# Patient Record
Sex: MALE | Race: WHITE | NOT HISPANIC OR LATINO | Employment: OTHER | URBAN - METROPOLITAN AREA
[De-identification: names, ages, dates, MRNs, and addresses within clinical notes are randomized per-mention and may not be internally consistent; named-entity substitution may affect disease eponyms.]

---

## 2018-01-24 ENCOUNTER — OFFICE VISIT (OUTPATIENT)
Dept: ORTHOPEDIC SURGERY | Facility: CLINIC | Age: 78
End: 2018-01-24

## 2018-01-24 VITALS — BODY MASS INDEX: 22.79 KG/M2 | WEIGHT: 159.2 LBS | TEMPERATURE: 98.5 F | HEIGHT: 70 IN

## 2018-01-24 DIAGNOSIS — M25.561 PAIN IN BOTH KNEES, UNSPECIFIED CHRONICITY: ICD-10-CM

## 2018-01-24 DIAGNOSIS — M25.562 PAIN IN BOTH KNEES, UNSPECIFIED CHRONICITY: ICD-10-CM

## 2018-01-24 DIAGNOSIS — G89.29 CHRONIC PAIN OF BOTH KNEES: ICD-10-CM

## 2018-01-24 DIAGNOSIS — M87.052 AVASCULAR NECROSIS OF BONES OF BOTH HIPS (HCC): ICD-10-CM

## 2018-01-24 DIAGNOSIS — M54.50 LOW BACK PAIN RADIATING TO BOTH LEGS: Primary | ICD-10-CM

## 2018-01-24 DIAGNOSIS — M25.561 CHRONIC PAIN OF BOTH KNEES: ICD-10-CM

## 2018-01-24 DIAGNOSIS — M79.604 LOW BACK PAIN RADIATING TO BOTH LEGS: Primary | ICD-10-CM

## 2018-01-24 DIAGNOSIS — M16.12 ARTHRITIS OF LEFT HIP: ICD-10-CM

## 2018-01-24 DIAGNOSIS — M51.36 DEGENERATIVE DISC DISEASE, LUMBAR: ICD-10-CM

## 2018-01-24 DIAGNOSIS — M16.11 ARTHRITIS OF RIGHT HIP: ICD-10-CM

## 2018-01-24 DIAGNOSIS — M25.562 CHRONIC PAIN OF BOTH KNEES: ICD-10-CM

## 2018-01-24 DIAGNOSIS — M87.051 AVASCULAR NECROSIS OF BONES OF BOTH HIPS (HCC): ICD-10-CM

## 2018-01-24 DIAGNOSIS — M79.605 LOW BACK PAIN RADIATING TO BOTH LEGS: Primary | ICD-10-CM

## 2018-01-24 PROCEDURE — 73521 X-RAY EXAM HIPS BI 2 VIEWS: CPT | Performed by: ORTHOPAEDIC SURGERY

## 2018-01-24 PROCEDURE — 73562 X-RAY EXAM OF KNEE 3: CPT | Performed by: ORTHOPAEDIC SURGERY

## 2018-01-24 PROCEDURE — 72100 X-RAY EXAM L-S SPINE 2/3 VWS: CPT | Performed by: ORTHOPAEDIC SURGERY

## 2018-01-24 PROCEDURE — 99204 OFFICE O/P NEW MOD 45 MIN: CPT | Performed by: ORTHOPAEDIC SURGERY

## 2018-01-24 NOTE — PROGRESS NOTES
Patient Name: Dong Cevallos   YOB: 1940  Referring Primary Care Physician: Hunter Law MD      Chief Complaint:    Chief Complaint   Patient presents with   • Right Hip - Establish Care, Pain   • Left Hip - Establish Care, Pain    hip and back pain that radiate down the front of the legs and knee pain    Subjective:    HPI:   Dong Cevallos is a pleasant 77 y.o. year old who presents today for evaluation of   Chief Complaint   Patient presents with   • Right Hip - Establish Care, Pain   • Left Hip - Establish Care, Pain   chronic pain that radiates as above.  No trauma.  Gradually harder to transfer, ambulate and dress over the last 4.5 yrs.  Has used a rollator but the wheel broke yesterday.  No traum, steroids or significant alcohol.  Doesn't hurt at rest.  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  seeen with his son. The right hurts more than the left and radiates to the knees.    This problem is new to this examiner.     Medications:   Home Medications:  Current Outpatient Prescriptions on File Prior to Visit   Medication Sig   • amLODIPine-benazepril (LOTREL 2.5-10) 2.5-10 MG per capsule Take 1 capsule by mouth daily.   • levETIRAcetam (KEPPRA) 500 MG tablet Take 1 tablet by mouth 2 (two) times a day.   • LORazepam (ATIVAN) 0.5 MG tablet Take 1 tablet by mouth every 8 (eight) hours as needed for anxiety.     No current facility-administered medications on file prior to visit.      Current Medications:  Scheduled Meds:  Continuous Infusions:  No current facility-administered medications for this visit.   PRN Meds:.    I have reviewed the patient's medical history in detail and updated the computerized patient record.  Review and summarization of old records includes:    Past Medical History:   Diagnosis Date   • Anxiety    • Hypertension    • Osteoarthritis         Past Surgical History:   Procedure Laterality Date   • APPENDECTOMY     • ENDOSCOPY          Social History      Occupational History   • Not on file.     Social History Main Topics   • Smoking status: Never Smoker   • Smokeless tobacco: Not on file   • Alcohol use No   • Drug use: Not on file   • Sexual activity: Not on file      Social History     Social History Narrative      No family history on file.    ROS: 14 point review of systems was performed and all other systems were reviewed and are negative except for documented findings in HPI and today's encounter.     Allergies:   Allergies   Allergen Reactions   • Asa [Aspirin]      Constitutional:  Denies fever, shaking or chills   Eyes:  Denies change in visual acuity   HENT:  Denies nasal congestion or sore throat   Respiratory:  Denies cough or shortness of breath   Cardiovascular:  Denies chest pain or severe LE edema   GI:  Denies abdominal pain, nausea, vomiting, bloody stools or diarrhea   Musculoskeletal:  Numbness, tingling, pain, or loss of motor function only as noted above in history of present illness.  : Denies painful urination or hematuria  Integument:  Denies rash, lesion or ulceration   Neurologic:  Denies headache or focal weakness  Endocrine:  Denies lymphadenopathy  Psych:  Denies confusion or change in mental status   Hem:  Denies active bleeding    Objective:    Physical Exam: 77 y.o. male  There is no height or weight on file to calculate BMI., @WT@, @HT@  There were no vitals filed for this visit.  Vital signs reviewed.   General Appearance:    Alert, cooperative, in no acute distress                  Eyes: conjunctiva clear  ENT: external ears and nose atraumatic  CV: no peripheral edema  Resp: normal respiratory effort  Skin: no rashes or wounds; normal turgor  Psych: mood and affect appropriate  Lymph: no nodes appreciated  Neuro: gross sensation intact  Vascular:  Palpable peripheral pulse in noted extremity  Musculoskeletal Extremities: the patient is in our wheelchair.  his knees have mild swelling but no sig joint line tenderness.  The  hips are tender and have crepiatation with any motion.  diffuse lower lumbar pain.  also with hx of large hernia on the right per the patient.     Radiology:   Imaging done today and discussed at length with the patient:    Indication: pain related symptoms,  Views: 2V AP&LAT, 3V AP, LAT & 40 degree PA bilateral knee(s), bilateral hip(s) and lumbar spine   Findings: severe end-stage arthritis (bone on bone, subchondral sclerosis/cysts, osteophytes), with avn of the hips and lumbar ddd with scoliosis, moderate arthrtis of the knees, calcification near pelvis- ? hernia  Comparison views: not available      Assessment:     ICD-10-CM ICD-9-CM   1. Low back pain radiating to both legs M54.5 724.2   2. Avascular necrosis of bones of both hips M87.051 733.42    M87.052    3. Arthritis of right hip M16.11 716.95   4. Arthritis of left hip M16.12 716.95   5. Degenerative disc disease, lumbar M51.36 722.52   6. Chronic pain of both knees M25.561 719.46    M25.562 338.29    G89.29    7. Pain in both knees, unspecified chronicity M25.561 719.46    M25.562         Procedures       Plan: Biomechanics of pertinent body area discussed.  Risks, benefits, alternatives, comparisons, and complications of accepted medicines, injections, recommendations, surgical procedures, and therapies explained and education provided in laymen's terms. The patient was given the opportunity to ask questions and they were answerved to their satisfaction.   Natural history and expected course of this patient's diagnosis discussed along with evaluation of therapies. Questions answered.  SAMIA: discussed hip replacement extensively and spent 45 minutes explaining xrays, diagnoses etc.  he and his son cynthia discuss and get back with me.if they wish to proceed. Continuation of conservative management vs. SAMIA discussed.  I reviewed anatomy of a total hip arthroplasty in laymen's terms, as well as typical postoperative recovery and possibly 6-12 months for  maximal recovery, and possible need for rehabilitation stay after hospitalization. We also discussed risks, benefits, alternatives, and limitations of procedure with risks including but not limited to neurovascular damage, bleeding, infection, malalignment, chronic pian, failure of implants, periprosthetic fracture, osteolysis, loosening of implants, loss of motion, weakness, stiffness, instability, DVT, pulmonary embolus, death, stroke, complex regional pain syndrome, myocardial infarction, and need for additional procedures. Concept of substitution vs. replacement discussed.  No guarantees were given regarding results of surgery.  Patient verbalized understanding, and was given the opportunity to ask and have all questions answered today.       1/24/2018

## 2018-01-25 ENCOUNTER — PREP FOR SURGERY (OUTPATIENT)
Dept: OTHER | Facility: HOSPITAL | Age: 78
End: 2018-01-25

## 2018-01-25 ENCOUNTER — TELEPHONE (OUTPATIENT)
Dept: ORTHOPEDIC SURGERY | Facility: CLINIC | Age: 78
End: 2018-01-25

## 2018-01-25 DIAGNOSIS — M16.11 ARTHRITIS OF RIGHT HIP: Primary | ICD-10-CM

## 2018-01-25 RX ORDER — CEFAZOLIN SODIUM 2 G/100ML
2 INJECTION, SOLUTION INTRAVENOUS ONCE
Status: CANCELLED | OUTPATIENT
Start: 2018-04-09 | End: 2018-04-09

## 2018-02-27 ENCOUNTER — APPOINTMENT (OUTPATIENT)
Dept: PREADMISSION TESTING | Facility: HOSPITAL | Age: 78
End: 2018-02-27

## 2018-02-27 VITALS
BODY MASS INDEX: 23.51 KG/M2 | WEIGHT: 164.19 LBS | HEART RATE: 86 BPM | SYSTOLIC BLOOD PRESSURE: 169 MMHG | OXYGEN SATURATION: 99 % | RESPIRATION RATE: 20 BRPM | TEMPERATURE: 97.5 F | HEIGHT: 70 IN | DIASTOLIC BLOOD PRESSURE: 93 MMHG

## 2018-02-27 DIAGNOSIS — M16.11 ARTHRITIS OF RIGHT HIP: ICD-10-CM

## 2018-02-27 LAB
AMORPH URATE CRY URNS QL MICRO: ABNORMAL /HPF
ANION GAP SERPL CALCULATED.3IONS-SCNC: 11.1 MMOL/L
BACTERIA UR QL AUTO: ABNORMAL /HPF
BILIRUB UR QL STRIP: NEGATIVE
BUN BLD-MCNC: 19 MG/DL (ref 8–23)
BUN/CREAT SERPL: 20.9 (ref 7–25)
CALCIUM SPEC-SCNC: 9.4 MG/DL (ref 8.6–10.5)
CHLORIDE SERPL-SCNC: 102 MMOL/L (ref 98–107)
CLARITY UR: ABNORMAL
CO2 SERPL-SCNC: 29.9 MMOL/L (ref 22–29)
COLOR UR: YELLOW
CREAT BLD-MCNC: 0.91 MG/DL (ref 0.76–1.27)
DEPRECATED RDW RBC AUTO: 54.8 FL (ref 37–54)
ERYTHROCYTE [DISTWIDTH] IN BLOOD BY AUTOMATED COUNT: 15.3 % (ref 11.5–14.5)
GFR SERPL CREATININE-BSD FRML MDRD: 81 ML/MIN/1.73
GLUCOSE BLD-MCNC: 116 MG/DL (ref 65–99)
GLUCOSE UR STRIP-MCNC: NEGATIVE MG/DL
HCT VFR BLD AUTO: 45.9 % (ref 40.4–52.2)
HGB BLD-MCNC: 14.8 G/DL (ref 13.7–17.6)
HGB UR QL STRIP.AUTO: ABNORMAL
HYALINE CASTS UR QL AUTO: ABNORMAL /LPF
KETONES UR QL STRIP: NEGATIVE
LEUKOCYTE ESTERASE UR QL STRIP.AUTO: NEGATIVE
MCH RBC QN AUTO: 31.3 PG (ref 27–32.7)
MCHC RBC AUTO-ENTMCNC: 32.2 G/DL (ref 32.6–36.4)
MCV RBC AUTO: 97 FL (ref 79.8–96.2)
NITRITE UR QL STRIP: NEGATIVE
PH UR STRIP.AUTO: 7 [PH] (ref 5–8)
PLATELET # BLD AUTO: 236 10*3/MM3 (ref 140–500)
PMV BLD AUTO: 9.7 FL (ref 6–12)
POTASSIUM BLD-SCNC: 3.7 MMOL/L (ref 3.5–5.2)
PROT UR QL STRIP: ABNORMAL
RBC # BLD AUTO: 4.73 10*6/MM3 (ref 4.6–6)
RBC # UR: ABNORMAL /HPF
REF LAB TEST METHOD: ABNORMAL
SODIUM BLD-SCNC: 143 MMOL/L (ref 136–145)
SP GR UR STRIP: 1.01 (ref 1–1.03)
SQUAMOUS #/AREA URNS HPF: ABNORMAL /HPF
UROBILINOGEN UR QL STRIP: ABNORMAL
WBC NRBC COR # BLD: 6.85 10*3/MM3 (ref 4.5–10.7)
WBC UR QL AUTO: ABNORMAL /HPF

## 2018-02-27 PROCEDURE — 85027 COMPLETE CBC AUTOMATED: CPT | Performed by: ORTHOPAEDIC SURGERY

## 2018-02-27 PROCEDURE — 80048 BASIC METABOLIC PNL TOTAL CA: CPT | Performed by: ORTHOPAEDIC SURGERY

## 2018-02-27 PROCEDURE — 93010 ELECTROCARDIOGRAM REPORT: CPT | Performed by: INTERNAL MEDICINE

## 2018-02-27 PROCEDURE — 81001 URINALYSIS AUTO W/SCOPE: CPT | Performed by: ORTHOPAEDIC SURGERY

## 2018-02-27 PROCEDURE — 36415 COLL VENOUS BLD VENIPUNCTURE: CPT

## 2018-02-27 PROCEDURE — 93005 ELECTROCARDIOGRAM TRACING: CPT

## 2018-02-27 RX ORDER — CHLORHEXIDINE GLUCONATE 500 MG/1
CLOTH TOPICAL
COMMUNITY
End: 2018-04-10 | Stop reason: HOSPADM

## 2018-02-27 ASSESSMENT — HOOS JR
HOOS JR SCORE: 16
HOOS JR SCORE: 39.902

## 2018-03-06 ENCOUNTER — OFFICE VISIT (OUTPATIENT)
Dept: ORTHOPEDIC SURGERY | Facility: CLINIC | Age: 78
End: 2018-03-06

## 2018-03-06 ENCOUNTER — OFFICE VISIT (OUTPATIENT)
Dept: SURGERY | Facility: CLINIC | Age: 78
End: 2018-03-06

## 2018-03-06 VITALS
BODY MASS INDEX: 23.48 KG/M2 | WEIGHT: 164 LBS | HEART RATE: 78 BPM | HEIGHT: 70 IN | DIASTOLIC BLOOD PRESSURE: 90 MMHG | OXYGEN SATURATION: 98 % | SYSTOLIC BLOOD PRESSURE: 155 MMHG

## 2018-03-06 VITALS — TEMPERATURE: 98 F | WEIGHT: 164 LBS | BODY MASS INDEX: 23.48 KG/M2 | HEIGHT: 70 IN

## 2018-03-06 DIAGNOSIS — K40.90 RIGHT INGUINAL HERNIA: Primary | ICD-10-CM

## 2018-03-06 DIAGNOSIS — M16.11 ARTHRITIS OF RIGHT HIP: Primary | ICD-10-CM

## 2018-03-06 PROCEDURE — 99214 OFFICE O/P EST MOD 30 MIN: CPT | Performed by: NURSE PRACTITIONER

## 2018-03-06 PROCEDURE — 99204 OFFICE O/P NEW MOD 45 MIN: CPT | Performed by: SURGERY

## 2018-03-06 RX ORDER — HYDROCORTISONE ACETATE 0.5 %
CREAM (GRAM) TOPICAL
Status: ON HOLD | COMMUNITY
End: 2018-03-09

## 2018-03-06 RX ORDER — B-COMPLEX WITH VITAMIN C
50 TABLET ORAL 4 TIMES DAILY
COMMUNITY
End: 2018-04-04

## 2018-03-06 NOTE — PROGRESS NOTES
Subjective   Dong Cevallos is a 77 y.o. male who presents to the office in surgical consultation from Hunter Law MD for a right inguinal hernia.    History of Present Illness     The patient has had a long-standing right inguinal hernia for several years.  It has slowly gotten larger and increasingly symptomatic.  He does have symptoms of pain as well as irregularity in his bowel function.  He also has some bladder dysfunction with frequent episodes of nocturia.  He has noticed enlarging disfiguration of his scrotum.    Review of Systems   Constitutional: Negative for activity change, appetite change, fatigue and fever.   HENT: Negative for trouble swallowing and voice change.    Respiratory: Negative for chest tightness and shortness of breath.    Cardiovascular: Negative for chest pain and palpitations.   Gastrointestinal: Positive for abdominal pain and constipation. Negative for blood in stool, diarrhea, nausea and vomiting.   Endocrine: Negative for cold intolerance and heat intolerance.   Genitourinary: Negative for dysuria and flank pain.   Neurological: Negative for dizziness and light-headedness.   Hematological: Negative for adenopathy. Does not bruise/bleed easily.   Psychiatric/Behavioral: Negative for agitation and confusion.     Past Medical History:   Diagnosis Date   • Bilateral hip pain    • History of anxiety    • History of prostate cancer    • History of seizures     NONE SINCE 9/2013   • Hypertension    • Inguinal hernia    • Osteoarthritis    • PONV (postoperative nausea and vomiting)    • Stroke 2008   • Unsteady gait      Past Surgical History:   Procedure Laterality Date   • APPENDECTOMY     • ENDOSCOPY     • PROSTATECTOMY       Family History   Problem Relation Age of Onset   • Malig Hyperthermia Neg Hx      Social History     Social History   • Marital status:      Spouse name: N/A   • Number of children: N/A   • Years of education: N/A     Occupational History   • Not on  file.     Social History Main Topics   • Smoking status: Never Smoker   • Smokeless tobacco: Never Used   • Alcohol use No   • Drug use: No   • Sexual activity: Defer     Other Topics Concern   • Not on file     Social History Narrative       Objective   Physical Exam   Constitutional: He is oriented to person, place, and time. He appears well-developed and well-nourished.  Non-toxic appearance.   Eyes: EOM are normal. No scleral icterus.   Pulmonary/Chest: Effort normal. No respiratory distress.   Abdominal: Soft. Normal appearance. There is no tenderness. A hernia is present. Hernia confirmed positive in the right inguinal area (There is a large right inguinal hernia that extends into the scrotum with significant GI contents in the scrotum). Hernia confirmed negative in the left inguinal area.   Neurological: He is alert and oriented to person, place, and time.   Skin: Skin is warm and dry.   Psychiatric: He has a normal mood and affect. His behavior is normal. Judgment and thought content normal.       Assessment/Plan       The encounter diagnosis was Right inguinal hernia.    The patient has a very large right inguinal hernia that extends into the scrotum and contains GI contents.  It may also contained a portion of the bladder.  He is at high risk of complications related to this large right inguinal hernia.  He will need a right inguinal hernia repair that may require a laparotomy for complete management.  The patient understands the indications, alternatives, risks, and benefits of the procedure and wishes to proceed.

## 2018-03-06 NOTE — PROGRESS NOTES
History & Physical       Patient: Dong Cevallos  YOB: 1940  Medical Record Number: [id number]  Body mass index is 23.53 kg/(m^2).    Surgeon:  Dr. George Gonzalez    Chief Complaints:   Chief Complaint   Patient presents with   • Right Hip - Follow-up, Pain       Subjective:    History of Present Illness: 77 y.o. male presents with   Chief Complaint   Patient presents with   • Right Hip - Follow-up, Pain   .Is here today for H&P visit but just found out from Dr. Daryn Zamora that he needs to have hernia repair prior to hip replacement.  We will plan on rescheduling for 8 weeks for R SAMIA..  Onset of symptoms was years ago and has been progressively worsening despite more conservative treatment measures.  Symptoms are associated with ability to move, exercise, and perform activities of daily living.  Symptoms are aggravated by weight bearing and ROM necessary for activities of daily living.   Symptoms improve with rest, ice and elevation only minimally.      Allergies:   Allergies   Allergen Reactions   • Asa [Aspirin] GI Bleeding       Medications:   Home Medications:  Current Outpatient Prescriptions on File Prior to Visit   Medication Sig   • Chlorhexidine Gluconate Cloth 2 % pads Apply  topically. AS DIRECTED PREOP   • mupirocin (BACTROBAN NASAL) 2 % nasal ointment into each nostril. AS DIRECTED PREOP   • [DISCONTINUED] amLODIPine-benazepril (LOTREL 2.5-10) 2.5-10 MG per capsule Take 1 capsule by mouth daily.   • [DISCONTINUED] levETIRAcetam (KEPPRA) 500 MG tablet Take 1 tablet by mouth 2 (two) times a day.   • [DISCONTINUED] LORazepam (ATIVAN) 0.5 MG tablet Take 1 tablet by mouth every 8 (eight) hours as needed for anxiety.     No current facility-administered medications on file prior to visit.      Current Medications:  Scheduled Meds:  Continuous Infusions:  No current facility-administered medications for this visit.   PRN Meds:.    I have reviewed the patient's medical history in detail  and updated the computerized patient record.  Review and summarization of old records include:    Past Medical History:   Diagnosis Date   • Bilateral hip pain    • History of anxiety    • History of prostate cancer    • History of seizures     NONE SINCE 9/2013   • Hypertension    • Inguinal hernia    • Osteoarthritis    • PONV (postoperative nausea and vomiting)    • Stroke 2008   • Unsteady gait         Past Surgical History:   Procedure Laterality Date   • APPENDECTOMY     • ENDOSCOPY     • PROSTATECTOMY          Social History     Occupational History   • Not on file.     Social History Main Topics   • Smoking status: Never Smoker   • Smokeless tobacco: Never Used   • Alcohol use No   • Drug use: No   • Sexual activity: Defer    Social History     Social History Narrative        Family History   Problem Relation Age of Onset   • Malig Hyperthermia Neg Hx        ROS: 14 point review of systems was performed and was negative except for documented findings in HPI and today's encounter.     Allergies:   Allergies   Allergen Reactions   • Asa [Aspirin] GI Bleeding     Constitutional:  Denies fever, shaking or chills   Eyes:  Denies change in visual acuity   HENT:  Denies nasal congestion or sore throat   Respiratory:  Denies cough or shortness of breath   Cardiovascular:  Denies chest pain or severe LE edema   GI:  Denies abdominal pain, nausea, vomiting, bloody stools or diarrhea   Musculoskeletal:  Denies numbness tingling or loss of motor function except as outlined above in history of present illness.  : Denies painful urination or did have Hematuria with UA for preop labs and discussed will need to have this rechecked by PCP when recovered from hernia repair.   Integument:  Denies rash, lesion or ulceration   Neurologic:  Denies headache or focal weakness  Endocrine:  Denies lymphadenopathy  Psych:  Denies confusion or change in mental status   Hem:  Denies active bleeding    Physical Exam: 77 y.o. male  Body  mass index is 23.53 kg/(m^2)., @HT@, @WT@  Vitals:    03/06/18 1316   Temp: 98 °F (36.7 °C)     Vital signs reviewed.   General Appearance:    Alert, cooperative, in no acute distress                  Eyes: conjunctiva clear  ENT: external ears and nose atraumatic  CV: no peripheral edema  Resp: normal respiratory effort  Skin: no rashes or wounds; normal turgor  Psych: mood and affect appropriate  Lymph: no nodes appreciated  Neuro: gross sensation intact  Vascular:  Palpable peripheral pulse in noted extremity  Musculoskeletal Extremities: HIP Exam: antalgic and stiff-legged gait with assistive device right hip, Stinchfield postitive, pain with internal rotation, stiffness and MEERA test positive 2+ pedal pulses and brisk capillary refill Pedal edema none          Diagnostic Tests:  Appointment on 02/27/2018   Component Date Value Ref Range Status   • Glucose 02/27/2018 116* 65 - 99 mg/dL Final   • BUN 02/27/2018 19  8 - 23 mg/dL Final   • Creatinine 02/27/2018 0.91  0.76 - 1.27 mg/dL Final   • Sodium 02/27/2018 143  136 - 145 mmol/L Final   • Potassium 02/27/2018 3.7  3.5 - 5.2 mmol/L Final   • Chloride 02/27/2018 102  98 - 107 mmol/L Final   • CO2 02/27/2018 29.9* 22.0 - 29.0 mmol/L Final   • Calcium 02/27/2018 9.4  8.6 - 10.5 mg/dL Final   • eGFR Non  Amer 02/27/2018 81  >60 mL/min/1.73 Final   • BUN/Creatinine Ratio 02/27/2018 20.9  7.0 - 25.0 Final   • Anion Gap 02/27/2018 11.1  mmol/L Final   • WBC 02/27/2018 6.85  4.50 - 10.70 10*3/mm3 Final   • RBC 02/27/2018 4.73  4.60 - 6.00 10*6/mm3 Final   • Hemoglobin 02/27/2018 14.8  13.7 - 17.6 g/dL Final   • Hematocrit 02/27/2018 45.9  40.4 - 52.2 % Final   • MCV 02/27/2018 97.0* 79.8 - 96.2 fL Final   • MCH 02/27/2018 31.3  27.0 - 32.7 pg Final   • MCHC 02/27/2018 32.2* 32.6 - 36.4 g/dL Final   • RDW 02/27/2018 15.3* 11.5 - 14.5 % Final   • RDW-SD 02/27/2018 54.8* 37.0 - 54.0 fl Final   • MPV 02/27/2018 9.7  6.0 - 12.0 fL Final   • Platelets 02/27/2018 236   140 - 500 10*3/mm3 Final   • Color, UA 02/27/2018 Yellow  Yellow, Straw Final   • Appearance, UA 02/27/2018 Cloudy* Clear Final   • pH, UA 02/27/2018 7.0  5.0 - 8.0 Final   • Specific Gravity, UA 02/27/2018 1.015  1.005 - 1.030 Final   • Glucose, UA 02/27/2018 Negative  Negative Final   • Ketones, UA 02/27/2018 Negative  Negative Final   • Bilirubin, UA 02/27/2018 Negative  Negative Final   • Blood, UA 02/27/2018 Moderate (2+)* Negative Final   • Protein, UA 02/27/2018 Trace* Negative Final   • Leuk Esterase, UA 02/27/2018 Negative  Negative Final   • Nitrite, UA 02/27/2018 Negative  Negative Final   • Urobilinogen, UA 02/27/2018 0.2 E.U./dL  0.2 - 1.0 E.U./dL Final   • RBC, UA 02/27/2018 21-30* None Seen, 0-2 /HPF Final   • WBC, UA 02/27/2018 0-2  None Seen, 0-2 /HPF Final   • Bacteria, UA 02/27/2018 1+* None Seen /HPF Final   • Squamous Epithelial Cells, UA 02/27/2018 0-2  None Seen, 0-2 /HPF Final   • Hyaline Casts, UA 02/27/2018 None Seen  None Seen /LPF Final   • Amorphous Crystals, UA 02/27/2018 Large/3+  None Seen /HPF Final   • Methodology 02/27/2018 Automated Microscopy   Final     No results found.    Imaging was done previously in the office, images were personally viewed and discussed at length with the patient:    Indication: pain related symptoms,  Views: 2V AP&LAT right hip(s)   Findings: severe end-stage arthritis (bone on bone, subchondral sclerosis/cysts, osteophytes)  Comparison views: viewed last xray done in the office.     Assessment:  Patient Active Problem List   Diagnosis   • Hypertension   • Anxiety   • Osteoarthritis   • Seizure disorder   • Low back pain radiating to both legs   • Avascular necrosis of bones of both hips   • Arthritis of right hip   • Arthritis of left hip   • Degenerative disc disease, lumbar   • Chronic pain of both knees   • Pain in both knees       Plan:  Dr. George oGnzalez reviewed anatomy of a total joint arthroplasty in laymen's terms, as well as typical  postoperative recovery and possibly 6-12 months for maximal recovery, and possible need for rehabilitation stay after hospitalization. We also discussed risks, benefits, alternatives, and limitations of procedure with risks including but not limited to neurovascular damage, bleeding, infection, malalignment, chronic pian, failure of implants, osteolysis, loosening of implants, loss of motion, weakness, stiffness, instability, DVT, pulmonary embolus, death, stroke, complex regional pain syndrome, myocardial infarction, and need for additional procedures. Concept of substitution vs. replacement discussed.  No guarantees were given regarding results of surgery.      Dong Cevallos was given the opportunity to ask and have all questions answered today.  The patient voiced understanding of the risks, benefits, and alternative forms of treatment that were discussed and the patient consents to proceed with surgery.     Patient's blood clot history is negative.  Planned DVT prophylaxis for surgery:  Xarelto    Hematuria with UA for preop labs and discussed will need to have this rechecked by PCP when recovered from hernia repair. We will plan on rescheduling for 8 weeks for R SAMIA.   Dr. Daryn Zamora that he needs to have hernia repair prior to hip replacement. I concur with this from ortho standpoint.     Discharge Plan: POD 2-3 to home and home health lives in [facility] but has emergency cord in bathroom for [facility]    Patient was seen by MIRELLA Em in the office today.    Date: 3/6/2018  MIRELLA White    Discussed this plan with Dr. Daryn Zamora and he thinks it is possible that the blood in the urine could be from incarceration of the bladder and will further investigate post operatively and will have his office call to schedule the hernia repair.     35 min spent with the patient with 30 spent in discussion face to face.

## 2018-03-09 ENCOUNTER — HOSPITAL ENCOUNTER (OUTPATIENT)
Facility: HOSPITAL | Age: 78
Discharge: HOME | End: 2018-03-10
Attending: SURGERY | Admitting: SURGERY

## 2018-03-09 ENCOUNTER — ANESTHESIA EVENT (OUTPATIENT)
Dept: PERIOP | Facility: HOSPITAL | Age: 78
End: 2018-03-09

## 2018-03-09 ENCOUNTER — ANESTHESIA (OUTPATIENT)
Dept: PERIOP | Facility: HOSPITAL | Age: 78
End: 2018-03-09

## 2018-03-09 DIAGNOSIS — K40.90 RIGHT INGUINAL HERNIA: ICD-10-CM

## 2018-03-09 PROCEDURE — 25010000002 PROPOFOL 10 MG/ML EMULSION: Performed by: ANESTHESIOLOGY

## 2018-03-09 PROCEDURE — 25010000002 NEOSTIGMINE PER 0.5 MG: Performed by: ANESTHESIOLOGY

## 2018-03-09 PROCEDURE — 63710000001 OXYCODONE-ACETAMINOPHEN 5-325 MG TABLET: Performed by: SURGERY

## 2018-03-09 PROCEDURE — 49505 PRP I/HERN INIT REDUC >5 YR: CPT | Performed by: SURGERY

## 2018-03-09 PROCEDURE — 25010000002 FENTANYL CITRATE (PF) 100 MCG/2ML SOLUTION: Performed by: ANESTHESIOLOGY

## 2018-03-09 PROCEDURE — 25010000002 MIDAZOLAM PER 1 MG: Performed by: ANESTHESIOLOGY

## 2018-03-09 PROCEDURE — 25010000002 CEFOXITIN PER 1 G: Performed by: SURGERY

## 2018-03-09 PROCEDURE — C1781 MESH (IMPLANTABLE): HCPCS | Performed by: SURGERY

## 2018-03-09 PROCEDURE — 25010000002 ONDANSETRON PER 1 MG: Performed by: ANESTHESIOLOGY

## 2018-03-09 PROCEDURE — 25010000002 SUCCINYLCHOLINE PER 20 MG: Performed by: ANESTHESIOLOGY

## 2018-03-09 PROCEDURE — A9270 NON-COVERED ITEM OR SERVICE: HCPCS | Performed by: SURGERY

## 2018-03-09 PROCEDURE — G0378 HOSPITAL OBSERVATION PER HR: HCPCS

## 2018-03-09 PROCEDURE — 25010000002 HYDRALAZINE PER 20 MG: Performed by: ANESTHESIOLOGY

## 2018-03-09 PROCEDURE — 25010000002 PHENYLEPHRINE PER 1 ML: Performed by: ANESTHESIOLOGY

## 2018-03-09 DEVICE — BARD SOFT MESH
Type: IMPLANTABLE DEVICE | Site: INGUINAL | Status: FUNCTIONAL
Brand: BARD SOFT MESH

## 2018-03-09 RX ORDER — LIDOCAINE HYDROCHLORIDE 10 MG/ML
0.5 INJECTION, SOLUTION EPIDURAL; INFILTRATION; INTRACAUDAL; PERINEURAL ONCE AS NEEDED
Status: DISCONTINUED | OUTPATIENT
Start: 2018-03-09 | End: 2018-03-09 | Stop reason: HOSPADM

## 2018-03-09 RX ORDER — DIPHENHYDRAMINE HYDROCHLORIDE 50 MG/ML
12.5 INJECTION INTRAMUSCULAR; INTRAVENOUS
Status: DISCONTINUED | OUTPATIENT
Start: 2018-03-09 | End: 2018-03-09 | Stop reason: HOSPADM

## 2018-03-09 RX ORDER — SODIUM CHLORIDE 0.9 % (FLUSH) 0.9 %
1-10 SYRINGE (ML) INJECTION AS NEEDED
Status: DISCONTINUED | OUTPATIENT
Start: 2018-03-09 | End: 2018-03-09 | Stop reason: HOSPADM

## 2018-03-09 RX ORDER — FENTANYL CITRATE 50 UG/ML
50 INJECTION, SOLUTION INTRAMUSCULAR; INTRAVENOUS
Status: DISCONTINUED | OUTPATIENT
Start: 2018-03-09 | End: 2018-03-09 | Stop reason: HOSPADM

## 2018-03-09 RX ORDER — PROMETHAZINE HYDROCHLORIDE 25 MG/1
12.5 TABLET ORAL ONCE AS NEEDED
Status: DISCONTINUED | OUTPATIENT
Start: 2018-03-09 | End: 2018-03-09 | Stop reason: HOSPADM

## 2018-03-09 RX ORDER — PROMETHAZINE HYDROCHLORIDE 25 MG/ML
12.5 INJECTION, SOLUTION INTRAMUSCULAR; INTRAVENOUS ONCE AS NEEDED
Status: DISCONTINUED | OUTPATIENT
Start: 2018-03-09 | End: 2018-03-09 | Stop reason: HOSPADM

## 2018-03-09 RX ORDER — ACETAMINOPHEN 650 MG
TABLET, EXTENDED RELEASE ORAL AS NEEDED
Status: DISCONTINUED | OUTPATIENT
Start: 2018-03-09 | End: 2018-03-09 | Stop reason: HOSPADM

## 2018-03-09 RX ORDER — HYDROMORPHONE HCL 110MG/55ML
0.5 PATIENT CONTROLLED ANALGESIA SYRINGE INTRAVENOUS
Status: DISCONTINUED | OUTPATIENT
Start: 2018-03-09 | End: 2018-03-10 | Stop reason: HOSPADM

## 2018-03-09 RX ORDER — LABETALOL HYDROCHLORIDE 5 MG/ML
5 INJECTION, SOLUTION INTRAVENOUS
Status: DISCONTINUED | OUTPATIENT
Start: 2018-03-09 | End: 2018-03-09 | Stop reason: HOSPADM

## 2018-03-09 RX ORDER — HYDROMORPHONE HCL 110MG/55ML
0.5 PATIENT CONTROLLED ANALGESIA SYRINGE INTRAVENOUS
Status: DISCONTINUED | OUTPATIENT
Start: 2018-03-09 | End: 2018-03-09 | Stop reason: HOSPADM

## 2018-03-09 RX ORDER — GLYCOPYRROLATE 0.2 MG/ML
INJECTION INTRAMUSCULAR; INTRAVENOUS AS NEEDED
Status: DISCONTINUED | OUTPATIENT
Start: 2018-03-09 | End: 2018-03-09 | Stop reason: SURG

## 2018-03-09 RX ORDER — OXYCODONE HYDROCHLORIDE AND ACETAMINOPHEN 5; 325 MG/1; MG/1
2 TABLET ORAL EVERY 4 HOURS PRN
Status: DISCONTINUED | OUTPATIENT
Start: 2018-03-09 | End: 2018-03-09 | Stop reason: SDUPTHER

## 2018-03-09 RX ORDER — FAMOTIDINE 10 MG/ML
20 INJECTION, SOLUTION INTRAVENOUS ONCE
Status: COMPLETED | OUTPATIENT
Start: 2018-03-09 | End: 2018-03-09

## 2018-03-09 RX ORDER — HYDRALAZINE HYDROCHLORIDE 20 MG/ML
5 INJECTION INTRAMUSCULAR; INTRAVENOUS
Status: DISCONTINUED | OUTPATIENT
Start: 2018-03-09 | End: 2018-03-09 | Stop reason: HOSPADM

## 2018-03-09 RX ORDER — ONDANSETRON 2 MG/ML
INJECTION INTRAMUSCULAR; INTRAVENOUS AS NEEDED
Status: DISCONTINUED | OUTPATIENT
Start: 2018-03-09 | End: 2018-03-09 | Stop reason: SURG

## 2018-03-09 RX ORDER — OXYCODONE AND ACETAMINOPHEN 7.5; 325 MG/1; MG/1
1 TABLET ORAL ONCE AS NEEDED
Status: DISCONTINUED | OUTPATIENT
Start: 2018-03-09 | End: 2018-03-09 | Stop reason: HOSPADM

## 2018-03-09 RX ORDER — PROPOFOL 10 MG/ML
VIAL (ML) INTRAVENOUS AS NEEDED
Status: DISCONTINUED | OUTPATIENT
Start: 2018-03-09 | End: 2018-03-09 | Stop reason: SURG

## 2018-03-09 RX ORDER — MIDAZOLAM HYDROCHLORIDE 1 MG/ML
1 INJECTION INTRAMUSCULAR; INTRAVENOUS
Status: DISCONTINUED | OUTPATIENT
Start: 2018-03-09 | End: 2018-03-09 | Stop reason: HOSPADM

## 2018-03-09 RX ORDER — MIDAZOLAM HYDROCHLORIDE 1 MG/ML
2 INJECTION INTRAMUSCULAR; INTRAVENOUS
Status: DISCONTINUED | OUTPATIENT
Start: 2018-03-09 | End: 2018-03-09 | Stop reason: HOSPADM

## 2018-03-09 RX ORDER — LIDOCAINE HYDROCHLORIDE 20 MG/ML
INJECTION, SOLUTION INFILTRATION; PERINEURAL AS NEEDED
Status: DISCONTINUED | OUTPATIENT
Start: 2018-03-09 | End: 2018-03-09 | Stop reason: SURG

## 2018-03-09 RX ORDER — ONDANSETRON 4 MG/1
4 TABLET, FILM COATED ORAL EVERY 6 HOURS PRN
Status: DISCONTINUED | OUTPATIENT
Start: 2018-03-09 | End: 2018-03-10 | Stop reason: HOSPADM

## 2018-03-09 RX ORDER — SUCCINYLCHOLINE CHLORIDE 20 MG/ML
INJECTION INTRAMUSCULAR; INTRAVENOUS AS NEEDED
Status: DISCONTINUED | OUTPATIENT
Start: 2018-03-09 | End: 2018-03-09 | Stop reason: SURG

## 2018-03-09 RX ORDER — ONDANSETRON 2 MG/ML
4 INJECTION INTRAMUSCULAR; INTRAVENOUS EVERY 6 HOURS PRN
Status: DISCONTINUED | OUTPATIENT
Start: 2018-03-09 | End: 2018-03-10 | Stop reason: HOSPADM

## 2018-03-09 RX ORDER — ONDANSETRON 2 MG/ML
4 INJECTION INTRAMUSCULAR; INTRAVENOUS ONCE AS NEEDED
Status: DISCONTINUED | OUTPATIENT
Start: 2018-03-09 | End: 2018-03-09 | Stop reason: HOSPADM

## 2018-03-09 RX ORDER — CYANOCOBALAMIN (VITAMIN B-12) 5000 MCG
5000 TABLET,DISINTEGRATING ORAL DAILY
COMMUNITY
End: 2018-04-10 | Stop reason: HOSPADM

## 2018-03-09 RX ORDER — ROCURONIUM BROMIDE 10 MG/ML
INJECTION, SOLUTION INTRAVENOUS AS NEEDED
Status: DISCONTINUED | OUTPATIENT
Start: 2018-03-09 | End: 2018-03-09 | Stop reason: SURG

## 2018-03-09 RX ORDER — BUPIVACAINE HYDROCHLORIDE AND EPINEPHRINE 5; 5 MG/ML; UG/ML
INJECTION, SOLUTION PERINEURAL AS NEEDED
Status: DISCONTINUED | OUTPATIENT
Start: 2018-03-09 | End: 2018-03-09 | Stop reason: HOSPADM

## 2018-03-09 RX ORDER — EPHEDRINE SULFATE 50 MG/ML
5 INJECTION, SOLUTION INTRAVENOUS ONCE AS NEEDED
Status: DISCONTINUED | OUTPATIENT
Start: 2018-03-09 | End: 2018-03-09 | Stop reason: HOSPADM

## 2018-03-09 RX ORDER — PROMETHAZINE HYDROCHLORIDE 25 MG/1
25 TABLET ORAL ONCE AS NEEDED
Status: DISCONTINUED | OUTPATIENT
Start: 2018-03-09 | End: 2018-03-09 | Stop reason: HOSPADM

## 2018-03-09 RX ORDER — PROMETHAZINE HYDROCHLORIDE 25 MG/1
25 SUPPOSITORY RECTAL ONCE AS NEEDED
Status: DISCONTINUED | OUTPATIENT
Start: 2018-03-09 | End: 2018-03-09 | Stop reason: HOSPADM

## 2018-03-09 RX ORDER — FLUMAZENIL 0.1 MG/ML
0.2 INJECTION INTRAVENOUS AS NEEDED
Status: DISCONTINUED | OUTPATIENT
Start: 2018-03-09 | End: 2018-03-09 | Stop reason: HOSPADM

## 2018-03-09 RX ORDER — FENTANYL CITRATE 50 UG/ML
INJECTION, SOLUTION INTRAMUSCULAR; INTRAVENOUS AS NEEDED
Status: DISCONTINUED | OUTPATIENT
Start: 2018-03-09 | End: 2018-03-09 | Stop reason: SURG

## 2018-03-09 RX ORDER — HYDROCODONE BITARTRATE AND ACETAMINOPHEN 7.5; 325 MG/1; MG/1
1 TABLET ORAL ONCE AS NEEDED
Status: DISCONTINUED | OUTPATIENT
Start: 2018-03-09 | End: 2018-03-09 | Stop reason: HOSPADM

## 2018-03-09 RX ORDER — NALOXONE HCL 0.4 MG/ML
0.1 VIAL (ML) INJECTION
Status: DISCONTINUED | OUTPATIENT
Start: 2018-03-09 | End: 2018-03-10 | Stop reason: HOSPADM

## 2018-03-09 RX ORDER — OXYCODONE HYDROCHLORIDE AND ACETAMINOPHEN 5; 325 MG/1; MG/1
1 TABLET ORAL EVERY 4 HOURS PRN
Status: DISCONTINUED | OUTPATIENT
Start: 2018-03-09 | End: 2018-03-10 | Stop reason: HOSPADM

## 2018-03-09 RX ORDER — MAGNESIUM 200 MG
1 TABLET ORAL DAILY
COMMUNITY
End: 2018-04-10 | Stop reason: HOSPADM

## 2018-03-09 RX ORDER — ONDANSETRON 4 MG/1
4 TABLET, ORALLY DISINTEGRATING ORAL EVERY 6 HOURS PRN
Status: DISCONTINUED | OUTPATIENT
Start: 2018-03-09 | End: 2018-03-10 | Stop reason: HOSPADM

## 2018-03-09 RX ORDER — EPHEDRINE SULFATE 50 MG/ML
INJECTION, SOLUTION INTRAVENOUS AS NEEDED
Status: DISCONTINUED | OUTPATIENT
Start: 2018-03-09 | End: 2018-03-09 | Stop reason: SURG

## 2018-03-09 RX ORDER — OXYCODONE HYDROCHLORIDE AND ACETAMINOPHEN 5; 325 MG/1; MG/1
2 TABLET ORAL EVERY 4 HOURS PRN
Status: DISCONTINUED | OUTPATIENT
Start: 2018-03-09 | End: 2018-03-10 | Stop reason: HOSPADM

## 2018-03-09 RX ORDER — SODIUM CHLORIDE, SODIUM LACTATE, POTASSIUM CHLORIDE, CALCIUM CHLORIDE 600; 310; 30; 20 MG/100ML; MG/100ML; MG/100ML; MG/100ML
9 INJECTION, SOLUTION INTRAVENOUS CONTINUOUS
Status: DISCONTINUED | OUTPATIENT
Start: 2018-03-09 | End: 2018-03-10 | Stop reason: HOSPADM

## 2018-03-09 RX ORDER — NALOXONE HCL 0.4 MG/ML
0.2 VIAL (ML) INJECTION AS NEEDED
Status: DISCONTINUED | OUTPATIENT
Start: 2018-03-09 | End: 2018-03-09 | Stop reason: HOSPADM

## 2018-03-09 RX ADMIN — MIDAZOLAM 1 MG: 1 INJECTION INTRAMUSCULAR; INTRAVENOUS at 12:46

## 2018-03-09 RX ADMIN — CEFOXITIN SODIUM 2 G: 1 POWDER, FOR SOLUTION INTRAVENOUS at 14:10

## 2018-03-09 RX ADMIN — PHENYLEPHRINE HYDROCHLORIDE 100 MCG: 10 INJECTION INTRAVENOUS at 14:30

## 2018-03-09 RX ADMIN — GLYCOPYRROLATE 0.6 MG: 0.2 INJECTION INTRAMUSCULAR; INTRAVENOUS at 15:57

## 2018-03-09 RX ADMIN — PHENYLEPHRINE HYDROCHLORIDE 100 MCG: 10 INJECTION INTRAVENOUS at 14:25

## 2018-03-09 RX ADMIN — ONDANSETRON 4 MG: 2 INJECTION INTRAMUSCULAR; INTRAVENOUS at 15:16

## 2018-03-09 RX ADMIN — FENTANYL CITRATE 100 MCG: 50 INJECTION INTRAMUSCULAR; INTRAVENOUS at 14:13

## 2018-03-09 RX ADMIN — ROCURONIUM BROMIDE 10 MG: 10 INJECTION INTRAVENOUS at 14:25

## 2018-03-09 RX ADMIN — Medication 5 MG: at 17:04

## 2018-03-09 RX ADMIN — OXYCODONE HYDROCHLORIDE AND ACETAMINOPHEN 1 TABLET: 5; 325 TABLET ORAL at 19:18

## 2018-03-09 RX ADMIN — PHENYLEPHRINE HYDROCHLORIDE 100 MCG: 10 INJECTION INTRAVENOUS at 15:53

## 2018-03-09 RX ADMIN — FENTANYL CITRATE 50 MCG: 50 INJECTION INTRAMUSCULAR; INTRAVENOUS at 16:50

## 2018-03-09 RX ADMIN — EPHEDRINE SULFATE 10 MG: 50 INJECTION INTRAMUSCULAR; INTRAVENOUS; SUBCUTANEOUS at 14:19

## 2018-03-09 RX ADMIN — NEOSTIGMINE METHYLSULFATE 3 MG: 1 INJECTION INTRAMUSCULAR; INTRAVENOUS; SUBCUTANEOUS at 15:57

## 2018-03-09 RX ADMIN — FAMOTIDINE 20 MG: 10 INJECTION INTRAVENOUS at 12:46

## 2018-03-09 RX ADMIN — EPHEDRINE SULFATE 10 MG: 50 INJECTION INTRAMUSCULAR; INTRAVENOUS; SUBCUTANEOUS at 14:20

## 2018-03-09 RX ADMIN — SODIUM CHLORIDE, POTASSIUM CHLORIDE, SODIUM LACTATE AND CALCIUM CHLORIDE: 600; 310; 30; 20 INJECTION, SOLUTION INTRAVENOUS at 14:45

## 2018-03-09 RX ADMIN — LIDOCAINE HYDROCHLORIDE 50 MG: 20 INJECTION, SOLUTION INFILTRATION; PERINEURAL at 14:13

## 2018-03-09 RX ADMIN — SODIUM CHLORIDE, POTASSIUM CHLORIDE, SODIUM LACTATE AND CALCIUM CHLORIDE 9 ML/HR: 600; 310; 30; 20 INJECTION, SOLUTION INTRAVENOUS at 12:28

## 2018-03-09 RX ADMIN — ROCURONIUM BROMIDE 10 MG: 10 INJECTION INTRAVENOUS at 14:45

## 2018-03-09 RX ADMIN — FENTANYL CITRATE 50 MCG: 50 INJECTION INTRAMUSCULAR; INTRAVENOUS at 17:00

## 2018-03-09 RX ADMIN — SUCCINYLCHOLINE CHLORIDE 100 MG: 20 INJECTION, SOLUTION INTRAMUSCULAR; INTRAVENOUS; PARENTERAL at 14:13

## 2018-03-09 RX ADMIN — PROPOFOL 200 MG: 10 INJECTION, EMULSION INTRAVENOUS at 14:13

## 2018-03-09 RX ADMIN — ROCURONIUM BROMIDE 20 MG: 10 INJECTION INTRAVENOUS at 14:13

## 2018-03-09 NOTE — OP NOTE
Surgeon: Matt Zamora Jr., M.D.    Assistant: Matt Zamora M.D.    Pre-Operative Diagnosis: Right inguinal hernia [K40.90]    Post-Op Diagnosis Codes:     * Right inguinal hernia [K40.90]      Procedure Performed: Right inguinal hernia repair    Indications:     The patient is a very pleasant 77-year-old white male with a large right inguinal hernia that descends into the scrotum.  He presents for right inguinal hernia repair.  The patient understands the indications, alternatives, risks, and benefits of the procedure and wishes to proceed.    Procedure:     The patient was identified and taken to the operating room where he was placed in the supine position on the operating table.  Monitors were placed and he underwent a general endotracheal anesthesia and was appropriately monitored throughout the case by the anesthesia personnel.  The right groin was prepped and draped in the standard surgical fashion.  The hernia was successfully reduced from the scrotum into the abdomen before starting surgery.  A Fox catheter was placed in the bladder.  A standard groin incision was made with a scalpel and carried through the skin into the subcutaneous tissue.  The subcutaneous tissue was divided using Bovie electrocautery to the external oblique aponeurosis which was very attenuated and divided in direction of its fibers.  The spermatic cord was then identified but very distorted due to the large hernia sac.  The hernia sac was elevated and carefully freed from attachments to the spermatic cord and inguinal canal which was a tedious dissection.  Once the hernia sac was completely mobilized, the spermatic cord was surrounded with a Penrose drain and the hernia sac was divided and a high ligation fashion using 2-0 Vicryl suture.  A piece of mesh was selected, soaked in antibiotics, and fashioned appropriately using a 3 x 6 Bard soft mesh.  It was then sutured in place using a Kenyatta type repair.  It was sutured to  the pubic tubercle and along the inguinal ligament using 2-0 proline sutures.  It was then tacked along conjoined tendon using 0 Ethibond sutures in an interrupted fashion.  Legs were created to reestablish the internal ring and secured with the 0 Ethibond sutures.  The cord was noted to be hemostatic.  The area was infiltrated with 0.5% Marcaine with epinephrine as well as Exparel.  The spermatic cord was returned to its original position.  The external oblique aponeurosis was reapproximated using 2-0 Vicryl sutures.  Kaushik's fascia was reapproximated using 3-0 Vicryl sutures in a running fashion.  The skin was then closed with 4-0 Monocryl in a subcuticular fashion followed by Mastisol and Steri-Strips and a sterile dressing.  The sponge, needle, and instrument counts were correct at the end the case.  The patient tolerated procedure well and was transferred to the recovery room in stable condition.    Estimated Blood Loss:  minimal      Specimens: None

## 2018-03-09 NOTE — PLAN OF CARE
Problem: Patient Care Overview (Adult)  Goal: Plan of Care Review  Outcome: Ongoing (interventions implemented as appropriate)   03/09/18 1812   Coping/Psychosocial Response Interventions   Plan Of Care Reviewed With patient   Patient Care Overview   Progress improving   Outcome Evaluation   Outcome Summary/Follow up Plan Arrived to floor from PACU at 1735. RLQ D/I scrotal support and ice in place. Due to void. F/C discontinued at end of sx ccase per OR. tolerating liquids with no problems       Problem: Perioperative Period (Adult)  Goal: Signs and Symptoms of Listed Potential Problems Will be Absent or Manageable (Perioperative Period)  Outcome: Ongoing (interventions implemented as appropriate)      Problem: Pain, Acute (Adult)  Goal: Identify Related Risk Factors and Signs and Symptoms  Outcome: Outcome(s) achieved Date Met: 03/09/18    Goal: Acceptable Pain Control/Comfort Level  Outcome: Ongoing (interventions implemented as appropriate)      Problem: Fall Risk (Adult)  Goal: Identify Related Risk Factors and Signs and Symptoms  Outcome: Outcome(s) achieved Date Met: 03/09/18    Goal: Absence of Falls  Outcome: Ongoing (interventions implemented as appropriate)

## 2018-03-09 NOTE — ANESTHESIA PREPROCEDURE EVALUATION
Anesthesia Evaluation     history of anesthetic complications: PONV  NPO Solid Status: > 8 hours             Airway   Mallampati: II  TM distance: >3 FB  Neck ROM: full  No difficulty expected  Dental - normal exam     Pulmonary - normal exam   Cardiovascular     ECG reviewed  Rhythm: regular    (+) hypertension, murmur,   (-) carotid bruits      Neuro/Psych  (+) seizures well controlled, CVA,       ROS Comment: Hx of stroke- numbness in feet since    Last sz years ago- no longer on meds  GI/Hepatic/Renal/Endo - negative ROS     Musculoskeletal     Abdominal  - normal exam   Substance History      OB/GYN          Other   (+) arthritis                     Anesthesia Plan    ASA 3     general   (  D/W R&B of GA including but not limited to: heart, lung, liver, kidney, neurologic problems, positioning injuries, dental damage, corneal abrasion and TMJ.  .)  intravenous induction

## 2018-03-09 NOTE — ANESTHESIA POSTPROCEDURE EVALUATION
Patient: Dong Cevallos    Procedure Summary     Date Anesthesia Start Anesthesia Stop Room / Location    03/09/18 1400 1622  [facility] / [facility]       Procedure Diagnosis Surgeon Provider    RIGHT INGUINAL HERNIA REPAIR WITH MESH (Right Abdomen) Right inguinal hernia  (Right inguinal hernia [K40.90]) MD Cal Adam Jr., MD          Anesthesia Type: general  Last vitals  BP   171/93 (03/09/18 1715)   Temp   36.4 °C (97.6 °F) (03/09/18 1715)   Pulse   81 (03/09/18 1715)   Resp   14 (03/09/18 1715)     SpO2   98 % (03/09/18 1715)     Post Anesthesia Care and Evaluation    Patient location during evaluation: PACU  Anesthetic complications: No anesthetic complications

## 2018-03-09 NOTE — PLAN OF CARE
Problem: Patient Care Overview (Adult)  Goal: Plan of Care Review  Outcome: Ongoing (interventions implemented as appropriate)   03/09/18 1204   Coping/Psychosocial Response Interventions   Plan Of Care Reviewed With patient   Patient Care Overview   Progress no change     Goal: Adult Individualization and Mutuality  Outcome: Ongoing (interventions implemented as appropriate)   03/09/18 1204   Individualization   Patient Specific Preferences none     Goal: Discharge Needs Assessment  Outcome: Ongoing (interventions implemented as appropriate)   03/09/18 1204   Discharge Needs Assessment   Concerns To Be Addressed no discharge needs identified       Problem: Perioperative Period (Adult)  Goal: Signs and Symptoms of Listed Potential Problems Will be Absent or Manageable (Perioperative Period)  Outcome: Ongoing (interventions implemented as appropriate)   03/09/18 1204   Perioperative Period   Problems Assessed (Perioperative Period) all   Problems Present (Perioperative Period) pain;physiologic stress response

## 2018-03-09 NOTE — PLAN OF CARE
Problem: Patient Care Overview (Adult)  Goal: Adult Individualization and Mutuality  Outcome: Ongoing (interventions implemented as appropriate)   03/09/18 1231   Individualization   Patient Specific Preferences Prefers to be calld Elvis

## 2018-03-09 NOTE — ANESTHESIA PROCEDURE NOTES
Airway  Urgency: elective    Date/Time: 3/9/2018 2:15 PM  Airway not difficult    General Information and Staff    Patient location during procedure: OR  Anesthesiologist: ITA SANTACRUZ    Indications and Patient Condition  Indications for airway management: airway protection    Preoxygenated: yes      Final Airway Details  Final airway type: endotracheal airway      Successful airway: ETT  Cuffed: yes   Successful intubation technique: direct laryngoscopy  Endotracheal tube insertion site: oral  Blade: Ed  Blade size: #3  ETT size: 7.5 mm  Cormack-Lehane Classification: grade I - full view of glottis  Placement verified by: chest auscultation and capnometry   Measured from: lips  ETT to lips (cm): 19  Number of attempts at approach: 1    Additional Comments  Atraumatic  serenity x1 ebe ebbs tube sec vent cont eyes taped

## 2018-03-10 VITALS
BODY MASS INDEX: 23.51 KG/M2 | WEIGHT: 164.2 LBS | HEART RATE: 84 BPM | SYSTOLIC BLOOD PRESSURE: 157 MMHG | OXYGEN SATURATION: 99 % | RESPIRATION RATE: 18 BRPM | TEMPERATURE: 97.5 F | DIASTOLIC BLOOD PRESSURE: 84 MMHG | HEIGHT: 70 IN

## 2018-03-10 LAB
ANION GAP SERPL CALCULATED.3IONS-SCNC: 6.3 MMOL/L
BASOPHILS # BLD AUTO: 0.02 10*3/MM3 (ref 0–0.2)
BASOPHILS NFR BLD AUTO: 0.2 % (ref 0–1.5)
BUN BLD-MCNC: 11 MG/DL (ref 8–23)
BUN/CREAT SERPL: 12.2 (ref 7–25)
CALCIUM SPEC-SCNC: 8.5 MG/DL (ref 8.6–10.5)
CHLORIDE SERPL-SCNC: 102 MMOL/L (ref 98–107)
CO2 SERPL-SCNC: 32.7 MMOL/L (ref 22–29)
CREAT BLD-MCNC: 0.9 MG/DL (ref 0.76–1.27)
DEPRECATED RDW RBC AUTO: 53.6 FL (ref 37–54)
EOSINOPHIL # BLD AUTO: 0.01 10*3/MM3 (ref 0–0.7)
EOSINOPHIL NFR BLD AUTO: 0.1 % (ref 0.3–6.2)
ERYTHROCYTE [DISTWIDTH] IN BLOOD BY AUTOMATED COUNT: 15 % (ref 11.5–14.5)
GFR SERPL CREATININE-BSD FRML MDRD: 82 ML/MIN/1.73
GLUCOSE BLD-MCNC: 105 MG/DL (ref 65–99)
HCT VFR BLD AUTO: 42 % (ref 40.4–52.2)
HGB BLD-MCNC: 13 G/DL (ref 13.7–17.6)
IMM GRANULOCYTES # BLD: 0 10*3/MM3 (ref 0–0.03)
IMM GRANULOCYTES NFR BLD: 0 % (ref 0–0.5)
LYMPHOCYTES # BLD AUTO: 1.19 10*3/MM3 (ref 0.9–4.8)
LYMPHOCYTES NFR BLD AUTO: 14.1 % (ref 19.6–45.3)
MCH RBC QN AUTO: 30.1 PG (ref 27–32.7)
MCHC RBC AUTO-ENTMCNC: 31 G/DL (ref 32.6–36.4)
MCV RBC AUTO: 97.2 FL (ref 79.8–96.2)
MONOCYTES # BLD AUTO: 0.92 10*3/MM3 (ref 0.2–1.2)
MONOCYTES NFR BLD AUTO: 10.9 % (ref 5–12)
NEUTROPHILS # BLD AUTO: 6.29 10*3/MM3 (ref 1.9–8.1)
NEUTROPHILS NFR BLD AUTO: 74.7 % (ref 42.7–76)
PLATELET # BLD AUTO: 174 10*3/MM3 (ref 140–500)
PMV BLD AUTO: 9.8 FL (ref 6–12)
POTASSIUM BLD-SCNC: 4.3 MMOL/L (ref 3.5–5.2)
RBC # BLD AUTO: 4.32 10*6/MM3 (ref 4.6–6)
SODIUM BLD-SCNC: 141 MMOL/L (ref 136–145)
WBC NRBC COR # BLD: 8.43 10*3/MM3 (ref 4.5–10.7)

## 2018-03-10 PROCEDURE — 85025 COMPLETE CBC W/AUTO DIFF WBC: CPT | Performed by: SURGERY

## 2018-03-10 PROCEDURE — G0378 HOSPITAL OBSERVATION PER HR: HCPCS

## 2018-03-10 PROCEDURE — 80048 BASIC METABOLIC PNL TOTAL CA: CPT | Performed by: SURGERY

## 2018-03-10 RX ORDER — OXYCODONE HYDROCHLORIDE AND ACETAMINOPHEN 5; 325 MG/1; MG/1
TABLET ORAL
Qty: 30 TABLET | Refills: 0 | Status: SHIPPED | OUTPATIENT
Start: 2018-03-10 | End: 2018-04-10 | Stop reason: HOSPADM

## 2018-03-10 NOTE — DISCHARGE SUMMARY
Discharge Summary    Date of admission: 3/9/2018    Date of discharge: 3/10/2018    Final diagnosis:    1.  Large right inguinal hernia    Procedures performed during admission:    1.  Right inguinal hernia repair on 3/9/2018    Reason for admission:    The patient is a very pleasant 77-year-old white male with a large right inguinal hernia that descends into the scrotum.  He was admitted for right inguinal hernia repair.    Hospital course:    The patient was admitted on 3/9/2018 and underwent a right inguinal hernia repair.  Based on the manipulation of GI contents from the scrotum he was admitted postoperatively.  He did quite well overall.  He did have urinary retention that required straight catheterization on 1 occasion but he was able to void after that.  He was able tolerate a diet without difficulty.  His pain was well controlled with oral pain medications.  He is discharged home with a prescription for Percocet.  He will follow-up with me in one week.

## 2018-03-10 NOTE — PLAN OF CARE
Problem: Patient Care Overview  Goal: Plan of Care Review  Outcome: Ongoing (interventions implemented as appropriate)  Continue symptom management and comfort care   03/10/18 0527   Coping/Psychosocial   Plan of Care Reviewed With patient   Plan of Care Review   Progress no change   OTHER   Outcome Summary Patient having difficulty voiding. Straight cath with 950 urine output. Voided per urinal later in the evening. Pain medication given once. Patient rested well the remainder of the shift.     Goal: Individualization and Mutuality  Outcome: Ongoing (interventions implemented as appropriate)    Goal: Discharge Needs Assessment  Outcome: Ongoing (interventions implemented as appropriate)    Goal: Interprofessional Rounds/Family Conf  Outcome: Ongoing (interventions implemented as appropriate)      Problem: Pain, Acute (Adult)  Goal: Identify Related Risk Factors and Signs and Symptoms  Outcome: Ongoing (interventions implemented as appropriate)    Goal: Acceptable Pain Control/Comfort Level  Outcome: Ongoing (interventions implemented as appropriate)

## 2018-03-12 NOTE — PROGRESS NOTES
Case Management Discharge Note    Final Note: Discharged to home on 3/10/18 @ 13:46. BTara Fernando RN, CCP    Destination     No service coordination in this encounter.      Durable Medical Equipment     No service coordination in this encounter.      Dialysis/Infusion     No service coordination in this encounter.      Home Medical Care     No service coordination in this encounter.      Social Care     No service coordination in this encounter.        Other: Other (private auto)    Final Discharge Disposition Code: 01 - home or self-care

## 2018-03-19 ENCOUNTER — TELEPHONE (OUTPATIENT)
Dept: ORTHOPEDIC SURGERY | Facility: CLINIC | Age: 78
End: 2018-03-19

## 2018-03-22 ENCOUNTER — OFFICE VISIT (OUTPATIENT)
Dept: SURGERY | Facility: CLINIC | Age: 78
End: 2018-03-22

## 2018-03-22 VITALS
SYSTOLIC BLOOD PRESSURE: 150 MMHG | WEIGHT: 164.2 LBS | BODY MASS INDEX: 23.51 KG/M2 | HEIGHT: 70 IN | OXYGEN SATURATION: 98 % | DIASTOLIC BLOOD PRESSURE: 90 MMHG | HEART RATE: 80 BPM

## 2018-03-22 DIAGNOSIS — Z48.89 POSTOPERATIVE VISIT: Primary | ICD-10-CM

## 2018-03-22 PROCEDURE — 99024 POSTOP FOLLOW-UP VISIT: CPT | Performed by: SURGERY

## 2018-03-22 NOTE — PROGRESS NOTES
Subjective   Dong Cevallos is a 77 y.o. male who returns to the office after undergoing a right inguinal hernia repair on 3/9/2018.     History of Present Illness     The patient is recovering well with no significant postop symptoms.  He is having no abdominal pain.  He has a good appetite and normal bowel function.  His energy level is good.      Review of Systems   Constitutional: Negative for activity change, appetite change, fatigue and fever.   Respiratory: Negative for chest tightness and shortness of breath.    Cardiovascular: Negative for chest pain and palpitations.   Gastrointestinal: Negative for abdominal pain, constipation, diarrhea and nausea.   Skin: Negative for rash and wound.   Psychiatric/Behavioral: Negative for agitation and confusion.       Objective   Physical Exam   Constitutional:  Non-toxic appearance. He does not appear ill. No distress.   Pulmonary/Chest: Effort normal. No respiratory distress.   Abdominal: Soft. Normal appearance. There is no tenderness.   Neurological: He is alert.   Skin:   Incision: intact with no evidence of infection.   Psychiatric: He has a normal mood and affect. His behavior is normal.       Assessment/Plan   The encounter diagnosis was Postoperative visit.    The patient is recovering well from his right inguinal hernia repair.  He was advised to avoid heavy lifting for another 1 month.  He is cleared for hip surgery at any time.

## 2018-03-27 ENCOUNTER — TELEPHONE (OUTPATIENT)
Dept: ORTHOPEDIC SURGERY | Facility: CLINIC | Age: 78
End: 2018-03-27

## 2018-04-03 ENCOUNTER — PREP FOR SURGERY (OUTPATIENT)
Dept: OTHER | Facility: HOSPITAL | Age: 78
End: 2018-04-03

## 2018-04-03 DIAGNOSIS — M16.11 PRIMARY OSTEOARTHRITIS OF RIGHT HIP: Primary | ICD-10-CM

## 2018-04-04 ENCOUNTER — APPOINTMENT (OUTPATIENT)
Dept: PREADMISSION TESTING | Facility: HOSPITAL | Age: 78
End: 2018-04-04

## 2018-04-04 VITALS
TEMPERATURE: 98 F | HEIGHT: 70 IN | OXYGEN SATURATION: 97 % | BODY MASS INDEX: 23.51 KG/M2 | HEART RATE: 77 BPM | WEIGHT: 164.2 LBS | SYSTOLIC BLOOD PRESSURE: 154 MMHG | DIASTOLIC BLOOD PRESSURE: 89 MMHG | RESPIRATION RATE: 16 BRPM

## 2018-04-04 DIAGNOSIS — M16.11 PRIMARY OSTEOARTHRITIS OF RIGHT HIP: ICD-10-CM

## 2018-04-04 LAB
ANION GAP SERPL CALCULATED.3IONS-SCNC: 11 MMOL/L
BILIRUB UR QL STRIP: NEGATIVE
BUN BLD-MCNC: 14 MG/DL (ref 8–23)
BUN/CREAT SERPL: 18.2 (ref 7–25)
CALCIUM SPEC-SCNC: 9.6 MG/DL (ref 8.6–10.5)
CHLORIDE SERPL-SCNC: 99 MMOL/L (ref 98–107)
CLARITY UR: ABNORMAL
CO2 SERPL-SCNC: 30 MMOL/L (ref 22–29)
COLOR UR: YELLOW
CREAT BLD-MCNC: 0.77 MG/DL (ref 0.76–1.27)
DEPRECATED RDW RBC AUTO: 52.8 FL (ref 37–54)
ERYTHROCYTE [DISTWIDTH] IN BLOOD BY AUTOMATED COUNT: 14.9 % (ref 11.5–14.5)
GFR SERPL CREATININE-BSD FRML MDRD: 98 ML/MIN/1.73
GLUCOSE BLD-MCNC: 91 MG/DL (ref 65–99)
GLUCOSE UR STRIP-MCNC: NEGATIVE MG/DL
HCT VFR BLD AUTO: 38.6 % (ref 40.4–52.2)
HGB BLD-MCNC: 11.9 G/DL (ref 13.7–17.6)
HGB UR QL STRIP.AUTO: NEGATIVE
KETONES UR QL STRIP: NEGATIVE
LEUKOCYTE ESTERASE UR QL STRIP.AUTO: NEGATIVE
MCH RBC QN AUTO: 29.8 PG (ref 27–32.7)
MCHC RBC AUTO-ENTMCNC: 30.8 G/DL (ref 32.6–36.4)
MCV RBC AUTO: 96.7 FL (ref 79.8–96.2)
NITRITE UR QL STRIP: NEGATIVE
PH UR STRIP.AUTO: 7 [PH] (ref 5–8)
PLATELET # BLD AUTO: 415 10*3/MM3 (ref 140–500)
PMV BLD AUTO: 9 FL (ref 6–12)
POTASSIUM BLD-SCNC: 4.7 MMOL/L (ref 3.5–5.2)
PROT UR QL STRIP: NEGATIVE
RBC # BLD AUTO: 3.99 10*6/MM3 (ref 4.6–6)
SODIUM BLD-SCNC: 140 MMOL/L (ref 136–145)
SP GR UR STRIP: 1.02 (ref 1–1.03)
UROBILINOGEN UR QL STRIP: ABNORMAL
WBC NRBC COR # BLD: 8.63 10*3/MM3 (ref 4.5–10.7)

## 2018-04-04 PROCEDURE — 80048 BASIC METABOLIC PNL TOTAL CA: CPT | Performed by: ORTHOPAEDIC SURGERY

## 2018-04-04 PROCEDURE — 36415 COLL VENOUS BLD VENIPUNCTURE: CPT

## 2018-04-04 PROCEDURE — 85027 COMPLETE CBC AUTOMATED: CPT | Performed by: ORTHOPAEDIC SURGERY

## 2018-04-04 PROCEDURE — 81003 URINALYSIS AUTO W/O SCOPE: CPT | Performed by: ORTHOPAEDIC SURGERY

## 2018-04-04 NOTE — DISCHARGE INSTRUCTIONS
ARRIVAL TIME 10:30      2% CHLORAHEXIDINE GLUCONATE* CLOTH  Preparing or “prepping” skin before surgery can reduce the risk of infection at the surgical site. To make the process easier, [facility] has chosen disposable cloths moistened with a rinse-free, 2% Chlorhexidine Gluconate (CHG) antiseptic solution. The steps below outline the prepping process and should be carefully followed.        Use the prep cloth on the area that is circled in the diagram             Directions Night before Surgery  1) Shower using a fresh bar of anti-bacterial soap (such as Dial) and clean washcloth.  Use a clean towel to completely dry your skin.  2) Do not use any lotions, oils or creams on your skin.  3) Open the package and remove 1 cloth, wipe your skin for 30 seconds in a circular motion.  Allow to dry for 3 minutes.  4) Repeat #3 with second cloth.  5) Do not touch your eyes, ears, or mouth with the prep cloth.  6) Allow the wet prep solution to air dry.  7) Discard the prep cloth and wash your hands with soap and water.   8) Dress in clean bed clothes and sleep on fresh clean bed sheets.   9) You may experience some temporary itching after the prep.    Directions Day of Surgery  1) Repeat steps 1,2,3,4,5,6,7, and 9.   2) Dress in clean clothes before coming to the hospital.    BACTROBAN NASAL OINTMENT  There are many germs normally in your nose. Bactroban is an ointment that will help reduce these germs. Please follow these instructions for Bactroban use:        ____The day before surgery in the morning  Date__4/8______    ____The day before surgery in the evening              Date__4/8______    ____The day of surgery in the morning    Date__4/9______    **Squirt ½ package of Bactroban Ointment onto a cotton applicator and apply to inside of 1st nostril.  Squirt the remaining Bactroban and apply to the inside of the other nostril.          General Instructions:  • Do not eat solid food after midnight the night  before surgery.  • You may drink clear liquids day of surgery but must stop at least one hour before your hospital arrival time.  • It is beneficial for you to have a clear drink that contains carbohydrates the day of surgery.  We suggest a 12 to 20 ounce bottle of Gatorade or Powerade for non-diabetic patients or a 12 to 20 ounce bottle of G2 or Powerade Zero for diabetic patients. (Pediatric patients, are not advised to drink a 12 to 20 ounce carbohydrate drink)    Clear liquids are liquids you can see through.  Nothing red in color.     Plain water                               Sports drinks  Sodas                                   Gelatin (Jell-O)  Fruit juices without pulp such as white grape juice and apple juice  Popsicles that contain no fruit or yogurt  Tea or coffee (no cream or milk added)  Gatorade / Powerade  G2 / Powerade Zero     • Patients who avoid smoking, chewing tobacco and alcohol for 4 weeks prior to surgery have a reduced risk of post-operative complications.  Quit smoking as many days before surgery as you can.  • Do not smoke, use chewing tobacco or drink alcohol the day of surgery.   • Bring any papers given to you in the doctor’s office.  • Wear clean comfortable clothes and socks.  • Do not wear contact lenses or make-up.  Bring a case for your glasses.   • Remove all piercings.  Leave jewelry and any other valuables at home.  • The Pre-Admission Testing nurse will instruct you to bring medications if unable to obtain an accurate list in Pre-Admission Testing.            Preventing a Surgical Site Infection:  • For 2 to 3 days before surgery, avoid shaving with a razor because the razor can irritate skin and make it easier to develop an infection.  • The night prior to surgery sleep in a clean bed with clean clothing.  Do not allow pets to sleep with you.  • Shower on the morning of surgery using a fresh bar of anti-bacterial soap (such as Dial) and clean washcloth.  Dry with a clean  towel and dress in clean clothing.  • Ask your surgeon if you will be receiving antibiotics prior to surgery.  • Make sure you, your family, and all healthcare providers clean their hands with soap and water or an alcohol based hand  before caring for you or your wound.    Day of surgery:  Upon arrival, a Pre-op nurse and Anesthesiologist will review your health history, obtain vital signs, and answer questions you may have.  The only belongings needed at this time will be your home medications and if applicable your C-PAP/BI-PAP machine.  If you are staying overnight your family can leave the rest of your belongings in the car and bring them to your room later.  A Pre-op nurse will start an IV and you may receive medication in preparation for surgery, including something to help you relax.  Your family will be able to see you in the Pre-op area.  While you are in surgery your family should notify the waiting room  if they leave the waiting room area and provide a contact phone number.    Please be aware that surgery does come with discomfort.  We want to make every effort to control your discomfort so please discuss any uncontrolled symptoms with your nurse.   Your doctor will most likely have prescribed pain medications.      If you are going home after surgery you will receive individualized written care instructions before being discharged.  A responsible adult must drive you to and from the hospital on the day of your surgery and stay with you for 24 hours.    If you are staying overnight following surgery, you will be transported to your hospital room following the recovery period.  [facility] has [facility] private rooms.    If you have any questions please call Pre-Admission Testing at [phone].  Deductibles and co-payments are collected on the day of service. Please be prepared to pay the required co-pay, deductible or deposit on the day of service as defined by your plan.

## 2018-04-06 ENCOUNTER — OFFICE VISIT (OUTPATIENT)
Dept: ORTHOPEDIC SURGERY | Facility: CLINIC | Age: 78
End: 2018-04-06

## 2018-04-06 VITALS — TEMPERATURE: 97.9 F | WEIGHT: 164 LBS | HEIGHT: 70 IN | BODY MASS INDEX: 23.48 KG/M2

## 2018-04-06 DIAGNOSIS — M16.11 ARTHRITIS OF RIGHT HIP: Primary | ICD-10-CM

## 2018-04-06 PROCEDURE — S0260 H&P FOR SURGERY: HCPCS | Performed by: NURSE PRACTITIONER

## 2018-04-09 ENCOUNTER — APPOINTMENT (OUTPATIENT)
Dept: GENERAL RADIOLOGY | Facility: HOSPITAL | Age: 78
End: 2018-04-09

## 2018-04-09 ENCOUNTER — TELEPHONE (OUTPATIENT)
Dept: ORTHOPEDIC SURGERY | Facility: CLINIC | Age: 78
End: 2018-04-09

## 2018-04-09 ENCOUNTER — ANESTHESIA EVENT (OUTPATIENT)
Dept: PERIOP | Facility: HOSPITAL | Age: 78
End: 2018-04-09

## 2018-04-09 ENCOUNTER — HOSPITAL ENCOUNTER (INPATIENT)
Facility: HOSPITAL | Age: 78
LOS: 1 days | Discharge: HOME HEALTH | End: 2018-04-10
Attending: ORTHOPAEDIC SURGERY | Admitting: ORTHOPAEDIC SURGERY

## 2018-04-09 ENCOUNTER — ANESTHESIA (OUTPATIENT)
Dept: PERIOP | Facility: HOSPITAL | Age: 78
End: 2018-04-09

## 2018-04-09 DIAGNOSIS — M16.11 ARTHRITIS OF RIGHT HIP: ICD-10-CM

## 2018-04-09 PROCEDURE — 25010000002 PROPOFOL 10 MG/ML EMULSION: Performed by: NURSE ANESTHETIST, CERTIFIED REGISTERED

## 2018-04-09 PROCEDURE — 8E0YXBZ COMPUTER ASSISTED PROCEDURE OF LOWER EXTREMITY: ICD-10-PCS | Performed by: ORTHOPAEDIC SURGERY

## 2018-04-09 PROCEDURE — 27130 TOTAL HIP ARTHROPLASTY: CPT | Performed by: NURSE PRACTITIONER

## 2018-04-09 PROCEDURE — 25010000002 MIDAZOLAM PER 1 MG: Performed by: ANESTHESIOLOGY

## 2018-04-09 PROCEDURE — 25010000002 FENTANYL CITRATE (PF) 100 MCG/2ML SOLUTION: Performed by: NURSE ANESTHETIST, CERTIFIED REGISTERED

## 2018-04-09 PROCEDURE — 0SR903Z REPLACEMENT OF RIGHT HIP JOINT WITH CERAMIC SYNTHETIC SUBSTITUTE, OPEN APPROACH: ICD-10-PCS | Performed by: ORTHOPAEDIC SURGERY

## 2018-04-09 PROCEDURE — 27130 TOTAL HIP ARTHROPLASTY: CPT | Performed by: ORTHOPAEDIC SURGERY

## 2018-04-09 PROCEDURE — 25010000003 CEFAZOLIN IN DEXTROSE 2-4 GM/100ML-% SOLUTION: Performed by: ORTHOPAEDIC SURGERY

## 2018-04-09 PROCEDURE — 25010000002 PHENYLEPHRINE PER 1 ML: Performed by: NURSE ANESTHETIST, CERTIFIED REGISTERED

## 2018-04-09 PROCEDURE — C1776 JOINT DEVICE (IMPLANTABLE): HCPCS | Performed by: ORTHOPAEDIC SURGERY

## 2018-04-09 PROCEDURE — 25010000002 NEOSTIGMINE PER 0.5 MG: Performed by: NURSE ANESTHETIST, CERTIFIED REGISTERED

## 2018-04-09 PROCEDURE — 25010000002 FENTANYL CITRATE (PF) 100 MCG/2ML SOLUTION: Performed by: ANESTHESIOLOGY

## 2018-04-09 PROCEDURE — 25010000002 ONDANSETRON PER 1 MG: Performed by: NURSE ANESTHETIST, CERTIFIED REGISTERED

## 2018-04-09 PROCEDURE — 25010000002 DEXAMETHASONE PER 1 MG: Performed by: NURSE ANESTHETIST, CERTIFIED REGISTERED

## 2018-04-09 PROCEDURE — 73501 X-RAY EXAM HIP UNI 1 VIEW: CPT

## 2018-04-09 PROCEDURE — 25010000002 ROPIVACAINE PER 1 MG: Performed by: ORTHOPAEDIC SURGERY

## 2018-04-09 PROCEDURE — 25010000002 KETOROLAC TROMETHAMINE PER 15 MG: Performed by: ORTHOPAEDIC SURGERY

## 2018-04-09 PROCEDURE — 25010000002 EPINEPHRINE PER 0.1 MG: Performed by: ORTHOPAEDIC SURGERY

## 2018-04-09 DEVICE — BIOLOX DELTA CERAMIC FEMORAL HEAD +1.5 36MM DIA 12/14 TAPER
Type: IMPLANTABLE DEVICE | Status: FUNCTIONAL
Brand: BIOLOX DELTA

## 2018-04-09 DEVICE — PINNACLE HIP SOLUTIONS ALTRX POLYETHYLENE ACETABULAR LINER +4 10 DEGREE 36MM ID 52MM OD
Type: IMPLANTABLE DEVICE | Status: FUNCTIONAL
Brand: PINNACLE ALTRX

## 2018-04-09 DEVICE — TRI-LOCK BPS FEMORAL STEM 12/14 TAPER TRI-LOCK BPS W/GRIPTION SIZE 6 HI 107MM
Type: IMPLANTABLE DEVICE | Status: FUNCTIONAL
Brand: TRI-LOCK GRIPTION

## 2018-04-09 DEVICE — TOTL HIP COA DEPUY 9641334: Type: IMPLANTABLE DEVICE | Status: FUNCTIONAL

## 2018-04-09 DEVICE — TOTL HIP GRIPTION CUP DEPUY UPCHRG: Type: IMPLANTABLE DEVICE | Status: FUNCTIONAL

## 2018-04-09 DEVICE — PINNACLE GRIPTION ACETABULAR SHELL SECTOR 52MM OD
Type: IMPLANTABLE DEVICE | Status: FUNCTIONAL
Brand: PINNACLE GRIPTION

## 2018-04-09 RX ORDER — FLUMAZENIL 0.1 MG/ML
0.2 INJECTION INTRAVENOUS AS NEEDED
Status: DISCONTINUED | OUTPATIENT
Start: 2018-04-09 | End: 2018-04-09 | Stop reason: HOSPADM

## 2018-04-09 RX ORDER — PROMETHAZINE HYDROCHLORIDE 25 MG/ML
12.5 INJECTION, SOLUTION INTRAMUSCULAR; INTRAVENOUS ONCE AS NEEDED
Status: DISCONTINUED | OUTPATIENT
Start: 2018-04-09 | End: 2018-04-09 | Stop reason: HOSPADM

## 2018-04-09 RX ORDER — SODIUM CHLORIDE, SODIUM LACTATE, POTASSIUM CHLORIDE, CALCIUM CHLORIDE 600; 310; 30; 20 MG/100ML; MG/100ML; MG/100ML; MG/100ML
9 INJECTION, SOLUTION INTRAVENOUS CONTINUOUS
Status: DISCONTINUED | OUTPATIENT
Start: 2018-04-09 | End: 2018-04-10 | Stop reason: HOSPADM

## 2018-04-09 RX ORDER — TRANEXAMIC ACID 100 MG/ML
INJECTION, SOLUTION INTRAVENOUS AS NEEDED
Status: DISCONTINUED | OUTPATIENT
Start: 2018-04-09 | End: 2018-04-09 | Stop reason: SURG

## 2018-04-09 RX ORDER — ASPIRIN 325 MG
325 TABLET, DELAYED RELEASE (ENTERIC COATED) ORAL DAILY
Status: DISCONTINUED | OUTPATIENT
Start: 2018-04-10 | End: 2018-04-10 | Stop reason: HOSPADM

## 2018-04-09 RX ORDER — ACETAMINOPHEN 325 MG/1
325 TABLET ORAL EVERY 4 HOURS PRN
Status: DISCONTINUED | OUTPATIENT
Start: 2018-04-09 | End: 2018-04-10 | Stop reason: HOSPADM

## 2018-04-09 RX ORDER — EPHEDRINE SULFATE 50 MG/ML
INJECTION, SOLUTION INTRAVENOUS AS NEEDED
Status: DISCONTINUED | OUTPATIENT
Start: 2018-04-09 | End: 2018-04-09 | Stop reason: SURG

## 2018-04-09 RX ORDER — ROCURONIUM BROMIDE 10 MG/ML
INJECTION, SOLUTION INTRAVENOUS AS NEEDED
Status: DISCONTINUED | OUTPATIENT
Start: 2018-04-09 | End: 2018-04-09 | Stop reason: SURG

## 2018-04-09 RX ORDER — DIPHENHYDRAMINE HCL 25 MG
25 CAPSULE ORAL EVERY 6 HOURS PRN
Status: DISCONTINUED | OUTPATIENT
Start: 2018-04-09 | End: 2018-04-10 | Stop reason: HOSPADM

## 2018-04-09 RX ORDER — PROMETHAZINE HYDROCHLORIDE 25 MG/1
12.5 TABLET ORAL ONCE AS NEEDED
Status: DISCONTINUED | OUTPATIENT
Start: 2018-04-09 | End: 2018-04-09 | Stop reason: HOSPADM

## 2018-04-09 RX ORDER — ASPIRIN 325 MG
325 TABLET, DELAYED RELEASE (ENTERIC COATED) ORAL DAILY
Status: DISCONTINUED | OUTPATIENT
Start: 2018-04-10 | End: 2018-04-09

## 2018-04-09 RX ORDER — ONDANSETRON 4 MG/1
4 TABLET, FILM COATED ORAL EVERY 6 HOURS PRN
Status: DISCONTINUED | OUTPATIENT
Start: 2018-04-09 | End: 2018-04-10 | Stop reason: HOSPADM

## 2018-04-09 RX ORDER — ONDANSETRON 4 MG/1
4 TABLET, ORALLY DISINTEGRATING ORAL EVERY 6 HOURS PRN
Status: DISCONTINUED | OUTPATIENT
Start: 2018-04-09 | End: 2018-04-10 | Stop reason: HOSPADM

## 2018-04-09 RX ORDER — OXYCODONE AND ACETAMINOPHEN 7.5; 325 MG/1; MG/1
1 TABLET ORAL
Status: DISCONTINUED | OUTPATIENT
Start: 2018-04-09 | End: 2018-04-10 | Stop reason: HOSPADM

## 2018-04-09 RX ORDER — HYDROMORPHONE HYDROCHLORIDE 1 MG/ML
0.5 INJECTION, SOLUTION INTRAMUSCULAR; INTRAVENOUS; SUBCUTANEOUS
Status: DISCONTINUED | OUTPATIENT
Start: 2018-04-09 | End: 2018-04-10 | Stop reason: HOSPADM

## 2018-04-09 RX ORDER — ACETAMINOPHEN 160 MG/5ML
650 SOLUTION ORAL EVERY 4 HOURS PRN
Status: DISCONTINUED | OUTPATIENT
Start: 2018-04-09 | End: 2018-04-10 | Stop reason: HOSPADM

## 2018-04-09 RX ORDER — OXYCODONE AND ACETAMINOPHEN 7.5; 325 MG/1; MG/1
1 TABLET ORAL ONCE AS NEEDED
Status: DISCONTINUED | OUTPATIENT
Start: 2018-04-09 | End: 2018-04-09 | Stop reason: HOSPADM

## 2018-04-09 RX ORDER — FAMOTIDINE 10 MG/ML
20 INJECTION, SOLUTION INTRAVENOUS ONCE
Status: COMPLETED | OUTPATIENT
Start: 2018-04-09 | End: 2018-04-09

## 2018-04-09 RX ORDER — DIPHENHYDRAMINE HYDROCHLORIDE 50 MG/ML
12.5 INJECTION INTRAMUSCULAR; INTRAVENOUS
Status: DISCONTINUED | OUTPATIENT
Start: 2018-04-09 | End: 2018-04-09 | Stop reason: HOSPADM

## 2018-04-09 RX ORDER — CEFAZOLIN SODIUM 2 G/100ML
2 INJECTION, SOLUTION INTRAVENOUS EVERY 8 HOURS
Status: COMPLETED | OUTPATIENT
Start: 2018-04-09 | End: 2018-04-10

## 2018-04-09 RX ORDER — DIPHENHYDRAMINE HYDROCHLORIDE 50 MG/ML
25 INJECTION INTRAMUSCULAR; INTRAVENOUS EVERY 6 HOURS PRN
Status: DISCONTINUED | OUTPATIENT
Start: 2018-04-09 | End: 2018-04-10 | Stop reason: HOSPADM

## 2018-04-09 RX ORDER — FENTANYL CITRATE 50 UG/ML
50 INJECTION, SOLUTION INTRAMUSCULAR; INTRAVENOUS
Status: DISCONTINUED | OUTPATIENT
Start: 2018-04-09 | End: 2018-04-09 | Stop reason: HOSPADM

## 2018-04-09 RX ORDER — BISACODYL 10 MG
10 SUPPOSITORY, RECTAL RECTAL DAILY PRN
Status: DISCONTINUED | OUTPATIENT
Start: 2018-04-09 | End: 2018-04-10 | Stop reason: HOSPADM

## 2018-04-09 RX ORDER — PROMETHAZINE HYDROCHLORIDE 25 MG/1
25 TABLET ORAL ONCE AS NEEDED
Status: DISCONTINUED | OUTPATIENT
Start: 2018-04-09 | End: 2018-04-09 | Stop reason: HOSPADM

## 2018-04-09 RX ORDER — ONDANSETRON 2 MG/ML
4 INJECTION INTRAMUSCULAR; INTRAVENOUS EVERY 6 HOURS PRN
Status: DISCONTINUED | OUTPATIENT
Start: 2018-04-09 | End: 2018-04-10 | Stop reason: HOSPADM

## 2018-04-09 RX ORDER — CEFAZOLIN SODIUM 2 G/100ML
2 INJECTION, SOLUTION INTRAVENOUS ONCE
Status: COMPLETED | OUTPATIENT
Start: 2018-04-09 | End: 2018-04-09

## 2018-04-09 RX ORDER — EPHEDRINE SULFATE 50 MG/ML
5 INJECTION, SOLUTION INTRAVENOUS ONCE AS NEEDED
Status: DISCONTINUED | OUTPATIENT
Start: 2018-04-09 | End: 2018-04-09 | Stop reason: HOSPADM

## 2018-04-09 RX ORDER — DOCUSATE SODIUM 100 MG/1
100 CAPSULE, LIQUID FILLED ORAL 2 TIMES DAILY
Status: DISCONTINUED | OUTPATIENT
Start: 2018-04-09 | End: 2018-04-10 | Stop reason: HOSPADM

## 2018-04-09 RX ORDER — HYDROMORPHONE HCL 110MG/55ML
0.5 PATIENT CONTROLLED ANALGESIA SYRINGE INTRAVENOUS
Status: DISCONTINUED | OUTPATIENT
Start: 2018-04-09 | End: 2018-04-09 | Stop reason: HOSPADM

## 2018-04-09 RX ORDER — MAGNESIUM HYDROXIDE 1200 MG/15ML
LIQUID ORAL AS NEEDED
Status: DISCONTINUED | OUTPATIENT
Start: 2018-04-09 | End: 2018-04-09 | Stop reason: HOSPADM

## 2018-04-09 RX ORDER — BISACODYL 5 MG/1
10 TABLET, DELAYED RELEASE ORAL DAILY PRN
Status: DISCONTINUED | OUTPATIENT
Start: 2018-04-09 | End: 2018-04-10 | Stop reason: HOSPADM

## 2018-04-09 RX ORDER — PROPOFOL 10 MG/ML
VIAL (ML) INTRAVENOUS AS NEEDED
Status: DISCONTINUED | OUTPATIENT
Start: 2018-04-09 | End: 2018-04-09 | Stop reason: SURG

## 2018-04-09 RX ORDER — HYDRALAZINE HYDROCHLORIDE 20 MG/ML
5 INJECTION INTRAMUSCULAR; INTRAVENOUS
Status: DISCONTINUED | OUTPATIENT
Start: 2018-04-09 | End: 2018-04-09 | Stop reason: HOSPADM

## 2018-04-09 RX ORDER — MIDAZOLAM HYDROCHLORIDE 1 MG/ML
1 INJECTION INTRAMUSCULAR; INTRAVENOUS
Status: DISCONTINUED | OUTPATIENT
Start: 2018-04-09 | End: 2018-04-09 | Stop reason: HOSPADM

## 2018-04-09 RX ORDER — PROMETHAZINE HYDROCHLORIDE 12.5 MG/1
12.5 TABLET ORAL EVERY 6 HOURS PRN
Status: DISCONTINUED | OUTPATIENT
Start: 2018-04-09 | End: 2018-04-10 | Stop reason: HOSPADM

## 2018-04-09 RX ORDER — NALOXONE HCL 0.4 MG/ML
0.2 VIAL (ML) INJECTION AS NEEDED
Status: DISCONTINUED | OUTPATIENT
Start: 2018-04-09 | End: 2018-04-09 | Stop reason: HOSPADM

## 2018-04-09 RX ORDER — OXYCODONE AND ACETAMINOPHEN 7.5; 325 MG/1; MG/1
2 TABLET ORAL
Status: DISCONTINUED | OUTPATIENT
Start: 2018-04-09 | End: 2018-04-10 | Stop reason: HOSPADM

## 2018-04-09 RX ORDER — ACETAMINOPHEN 325 MG/1
650 TABLET ORAL EVERY 4 HOURS PRN
Status: DISCONTINUED | OUTPATIENT
Start: 2018-04-09 | End: 2018-04-10 | Stop reason: HOSPADM

## 2018-04-09 RX ORDER — ACETAMINOPHEN 650 MG/1
650 SUPPOSITORY RECTAL EVERY 4 HOURS PRN
Status: DISCONTINUED | OUTPATIENT
Start: 2018-04-09 | End: 2018-04-10 | Stop reason: HOSPADM

## 2018-04-09 RX ORDER — PROMETHAZINE HYDROCHLORIDE 25 MG/1
25 SUPPOSITORY RECTAL ONCE AS NEEDED
Status: DISCONTINUED | OUTPATIENT
Start: 2018-04-09 | End: 2018-04-09 | Stop reason: HOSPADM

## 2018-04-09 RX ORDER — MIDAZOLAM HYDROCHLORIDE 1 MG/ML
2 INJECTION INTRAMUSCULAR; INTRAVENOUS
Status: DISCONTINUED | OUTPATIENT
Start: 2018-04-09 | End: 2018-04-09 | Stop reason: HOSPADM

## 2018-04-09 RX ORDER — ONDANSETRON 2 MG/ML
4 INJECTION INTRAMUSCULAR; INTRAVENOUS ONCE AS NEEDED
Status: COMPLETED | OUTPATIENT
Start: 2018-04-09 | End: 2018-04-09

## 2018-04-09 RX ORDER — SCOLOPAMINE TRANSDERMAL SYSTEM 1 MG/1
1 PATCH, EXTENDED RELEASE TRANSDERMAL
Status: DISCONTINUED | OUTPATIENT
Start: 2018-04-09 | End: 2018-04-10 | Stop reason: HOSPADM

## 2018-04-09 RX ORDER — SODIUM CHLORIDE, SODIUM LACTATE, POTASSIUM CHLORIDE, CALCIUM CHLORIDE 600; 310; 30; 20 MG/100ML; MG/100ML; MG/100ML; MG/100ML
100 INJECTION, SOLUTION INTRAVENOUS CONTINUOUS
Status: DISCONTINUED | OUTPATIENT
Start: 2018-04-09 | End: 2018-04-10 | Stop reason: HOSPADM

## 2018-04-09 RX ORDER — SODIUM CHLORIDE 0.9 % (FLUSH) 0.9 %
1-10 SYRINGE (ML) INJECTION AS NEEDED
Status: DISCONTINUED | OUTPATIENT
Start: 2018-04-09 | End: 2018-04-09 | Stop reason: HOSPADM

## 2018-04-09 RX ORDER — LABETALOL HYDROCHLORIDE 5 MG/ML
5 INJECTION, SOLUTION INTRAVENOUS
Status: DISCONTINUED | OUTPATIENT
Start: 2018-04-09 | End: 2018-04-09 | Stop reason: HOSPADM

## 2018-04-09 RX ORDER — GLYCOPYRROLATE 0.2 MG/ML
INJECTION INTRAMUSCULAR; INTRAVENOUS AS NEEDED
Status: DISCONTINUED | OUTPATIENT
Start: 2018-04-09 | End: 2018-04-09 | Stop reason: SURG

## 2018-04-09 RX ORDER — DEXAMETHASONE SODIUM PHOSPHATE 10 MG/ML
INJECTION INTRAMUSCULAR; INTRAVENOUS AS NEEDED
Status: DISCONTINUED | OUTPATIENT
Start: 2018-04-09 | End: 2018-04-09 | Stop reason: SURG

## 2018-04-09 RX ORDER — NALOXONE HCL 0.4 MG/ML
0.1 VIAL (ML) INJECTION
Status: DISCONTINUED | OUTPATIENT
Start: 2018-04-09 | End: 2018-04-10 | Stop reason: HOSPADM

## 2018-04-09 RX ORDER — HYDROCODONE BITARTRATE AND ACETAMINOPHEN 7.5; 325 MG/1; MG/1
1 TABLET ORAL ONCE AS NEEDED
Status: DISCONTINUED | OUTPATIENT
Start: 2018-04-09 | End: 2018-04-09 | Stop reason: HOSPADM

## 2018-04-09 RX ORDER — LIDOCAINE HYDROCHLORIDE 10 MG/ML
0.5 INJECTION, SOLUTION EPIDURAL; INFILTRATION; INTRACAUDAL; PERINEURAL ONCE AS NEEDED
Status: DISCONTINUED | OUTPATIENT
Start: 2018-04-09 | End: 2018-04-09 | Stop reason: HOSPADM

## 2018-04-09 RX ADMIN — FAMOTIDINE 20 MG: 10 INJECTION INTRAVENOUS at 11:07

## 2018-04-09 RX ADMIN — FENTANYL CITRATE 50 MCG: 50 INJECTION INTRAMUSCULAR; INTRAVENOUS at 13:17

## 2018-04-09 RX ADMIN — POLYETHYLENE GLYCOL 3350 17 G: 17 POWDER, FOR SOLUTION ORAL at 17:43

## 2018-04-09 RX ADMIN — SODIUM CHLORIDE, POTASSIUM CHLORIDE, SODIUM LACTATE AND CALCIUM CHLORIDE: 600; 310; 30; 20 INJECTION, SOLUTION INTRAVENOUS at 14:36

## 2018-04-09 RX ADMIN — EPHEDRINE SULFATE 10 MG: 50 INJECTION INTRAMUSCULAR; INTRAVENOUS; SUBCUTANEOUS at 12:56

## 2018-04-09 RX ADMIN — PROPOFOL 200 MG: 10 INJECTION, EMULSION INTRAVENOUS at 12:43

## 2018-04-09 RX ADMIN — PHENYLEPHRINE HYDROCHLORIDE 50 MCG: 10 INJECTION INTRAVENOUS at 13:15

## 2018-04-09 RX ADMIN — TRANEXAMIC ACID 500 MG: 100 INJECTION, SOLUTION INTRAVENOUS at 13:16

## 2018-04-09 RX ADMIN — NEOSTIGMINE METHYLSULFATE 2 MG: 1 INJECTION INTRAMUSCULAR; INTRAVENOUS; SUBCUTANEOUS at 14:30

## 2018-04-09 RX ADMIN — DEXAMETHASONE SODIUM PHOSPHATE 8 MG: 10 INJECTION INTRAMUSCULAR; INTRAVENOUS at 13:08

## 2018-04-09 RX ADMIN — MUPIROCIN: 20 OINTMENT TOPICAL at 21:13

## 2018-04-09 RX ADMIN — CEFAZOLIN SODIUM 2 G: 2 INJECTION, SOLUTION INTRAVENOUS at 12:52

## 2018-04-09 RX ADMIN — SCOPALAMINE 1 PATCH: 1 PATCH, EXTENDED RELEASE TRANSDERMAL at 11:06

## 2018-04-09 RX ADMIN — SODIUM CHLORIDE, POTASSIUM CHLORIDE, SODIUM LACTATE AND CALCIUM CHLORIDE 100 ML/HR: 600; 310; 30; 20 INJECTION, SOLUTION INTRAVENOUS at 17:45

## 2018-04-09 RX ADMIN — FENTANYL CITRATE 50 MCG: 50 INJECTION INTRAMUSCULAR; INTRAVENOUS at 12:43

## 2018-04-09 RX ADMIN — ROCURONIUM BROMIDE 40 MG: 10 INJECTION INTRAVENOUS at 12:43

## 2018-04-09 RX ADMIN — PHENYLEPHRINE HYDROCHLORIDE 50 MCG: 10 INJECTION INTRAVENOUS at 13:00

## 2018-04-09 RX ADMIN — OXYCODONE HYDROCHLORIDE AND ACETAMINOPHEN 1 TABLET: 7.5; 325 TABLET ORAL at 17:40

## 2018-04-09 RX ADMIN — RIVAROXABAN 10 MG: 10 TABLET, FILM COATED ORAL at 21:13

## 2018-04-09 RX ADMIN — GLYCOPYRROLATE 0.4 MG: 0.2 INJECTION INTRAMUSCULAR; INTRAVENOUS at 14:30

## 2018-04-09 RX ADMIN — CEFAZOLIN SODIUM 2 G: 2 INJECTION, SOLUTION INTRAVENOUS at 23:24

## 2018-04-09 RX ADMIN — MIDAZOLAM 1 MG: 1 INJECTION INTRAMUSCULAR; INTRAVENOUS at 11:07

## 2018-04-09 RX ADMIN — FENTANYL CITRATE 50 MCG: 50 INJECTION INTRAMUSCULAR; INTRAVENOUS at 14:53

## 2018-04-09 RX ADMIN — SODIUM CHLORIDE, POTASSIUM CHLORIDE, SODIUM LACTATE AND CALCIUM CHLORIDE 9 ML/HR: 600; 310; 30; 20 INJECTION, SOLUTION INTRAVENOUS at 11:06

## 2018-04-09 RX ADMIN — DOCUSATE SODIUM 100 MG: 100 CAPSULE, LIQUID FILLED ORAL at 21:13

## 2018-04-09 RX ADMIN — ROCURONIUM BROMIDE 10 MG: 10 INJECTION INTRAVENOUS at 13:19

## 2018-04-09 RX ADMIN — CEFAZOLIN SODIUM 2 G: 2 INJECTION, SOLUTION INTRAVENOUS at 15:48

## 2018-04-09 RX ADMIN — ONDANSETRON HYDROCHLORIDE 4 MG: 2 SOLUTION INTRAMUSCULAR; INTRAVENOUS at 14:29

## 2018-04-09 NOTE — H&P (VIEW-ONLY)
"   History & Physical       Patient: Dong Cevallos  YOB: 1940  Medical Record Number: [id number]  Wt Readings from Last 3 Encounters:   04/06/18 74.4 kg (164 lb)   04/04/18 74.5 kg (164 lb 3.2 oz)   03/22/18 74.5 kg (164 lb 3.2 oz)     Ht Readings from Last 3 Encounters:   04/06/18 177.8 cm (70\")   04/04/18 177.8 cm (70\")   03/22/18 177.8 cm (70\")     Body mass index is 23.53 kg/m².  Facility age limit for growth percentiles is 20 years.    Surgeon:  Dr. George Gonzalez    Chief Complaints:   Chief Complaint   Patient presents with   • Right Hip - Follow-up, Pain       Subjective:    History of Present Illness: 77 y.o. male presents with   Chief Complaint   Patient presents with   • Right Hip - Follow-up, Pain   . Onset of symptoms was years ago and has been progressively worsening despite more conservative treatment measures.  Symptoms are associated with ability to move, exercise, and perform activities of daily living.  Symptoms are aggravated by weight bearing and ROM necessary for activities of daily living.   Symptoms improve with rest, ice and elevation only minimally.      Allergies:   Allergies   Allergen Reactions   • Asa [Aspirin] GI Bleeding       Medications:   Home Medications:  Current Outpatient Prescriptions on File Prior to Visit   Medication Sig   • Chlorhexidine Gluconate Cloth 2 % pads Apply  topically. AS DIRECTED PREOP   • Cholecalciferol (VITAMIN D3) 5000 units capsule capsule Take 5,000 Units by mouth Daily.   • Cyanocobalamin (VITAMIN B-12) 1000 MCG sublingual tablet Place 1 tablet under the tongue Daily.   • Cyanocobalamin (VITAMIN B-12) 5000 MCG tablet dispersible Take 5,000 mcg by mouth Daily.   • Multiple Vitamins-Minerals (MULTIVITAMIN ADULTS PO) Take 1 tablet by mouth Daily.   • mupirocin (BACTROBAN NASAL) 2 % nasal ointment into each nostril. AS DIRECTED PREOP   • NON FORMULARY Take 1 capsule by mouth Daily. Avocado/soy 100 mg/200mg   PT TO STOP PER MD INSTRUCTION "   • NON FORMULARY Take 525 mg by mouth 2 (Two) Times a Day. Celadrin   • oxyCODONE-acetaminophen (PERCOCET) 5-325 MG per tablet 1-2 q 6 hrs prn pain (Patient taking differently: Take 1 tablet by mouth Every 6 (Six) Hours As Needed. 1-2 q 6 hrs prn pain)     Current Facility-Administered Medications on File Prior to Visit   Medication   • [] mupirocin (BACTROBAN) 2 % nasal ointment     Current Medications:  Scheduled Meds:  Continuous Infusions:  No current facility-administered medications for this visit.   PRN Meds:.    I have reviewed the patient's medical history in detail and updated the computerized patient record.  Review and summarization of old records include:    Past Medical History:   Diagnosis Date   • Bilateral hip pain    • History of anxiety    • History of prostate cancer    • History of seizures     NONE SINCE 2013   • Osteoarthritis    • PONV (postoperative nausea and vomiting)    • Stroke    • Unsteady gait         Past Surgical History:   Procedure Laterality Date   • APPENDECTOMY     • ENDOSCOPY     • INGUINAL HERNIA REPAIR Right 3/9/2018    Procedure: RIGHT INGUINAL HERNIA REPAIR WITH MESH;  Surgeon: Matt Zamora Jr., MD;  Location: [facility];  Service: General   • PROSTATECTOMY          Social History     Occupational History   • Not on file.     Social History Main Topics   • Smoking status: Never Smoker   • Smokeless tobacco: Never Used   • Alcohol use No   • Drug use: No   • Sexual activity: Not on file    Social History     Social History Narrative   • No narrative on file        Family History   Problem Relation Age of Onset   • Malig Hyperthermia Neg Hx        ROS: 14 point review of systems was performed and was negative except for documented findings in HPI and today's encounter.     Allergies:   Allergies   Allergen Reactions   • Asa [Aspirin] GI Bleeding     Constitutional:  Denies fever, shaking or chills   Eyes:  Denies change in visual acuity   HENT:  Denies  "nasal congestion or sore throat   Respiratory:  Denies cough or shortness of breath   Cardiovascular:  Denies chest pain or severe LE edema   GI:  Denies abdominal pain, nausea, vomiting, bloody stools or diarrhea   Musculoskeletal:  Denies numbness tingling or loss of motor function except as outlined above in history of present illness.  : Denies painful urination or hematuria  Integument:  Denies rash, lesion or ulceration   Neurologic:  Denies headache or focal weakness  Endocrine:  Denies lymphadenopathy  Psych:  Denies confusion or change in mental status   Hem:  Denies active bleeding    Physical Exam: 77 y.o. male  Wt Readings from Last 3 Encounters:   04/06/18 74.4 kg (164 lb)   04/04/18 74.5 kg (164 lb 3.2 oz)   03/22/18 74.5 kg (164 lb 3.2 oz)     Ht Readings from Last 3 Encounters:   04/06/18 177.8 cm (70\")   04/04/18 177.8 cm (70\")   03/22/18 177.8 cm (70\")     Body mass index is 23.53 kg/m².  Facility age limit for growth percentiles is 20 years.  Vitals:    04/06/18 1018   Temp: 97.9 °F (36.6 °C)       Vital signs reviewed.   General Appearance:    Alert, cooperative, in no acute distress                  Eyes: conjunctiva clear  ENT: external ears and nose atraumatic  CV: no peripheral edema  Resp: normal respiratory effort  Skin: no rashes or wounds; normal turgor  Psych: mood and affect appropriate  Lymph: no nodes appreciated  Neuro: gross sensation intact  Vascular:  Palpable peripheral pulse in noted extremity  Musculoskeletal Extremities: HIP Exam: antalgic and stiff-legged gait with assistive device right hip, Stinchfield postitive, pain with internal rotation, stiffness and MEERA test positive 2+ pedal pulses and brisk capillary refill Pedal edema none           Diagnostic Tests:  Appointment on 04/04/2018   Component Date Value Ref Range Status   • Glucose 04/04/2018 91  65 - 99 mg/dL Final   • BUN 04/04/2018 14  8 - 23 mg/dL Final   • Creatinine 04/04/2018 0.77  0.76 - 1.27 mg/dL Final "   • Sodium 04/04/2018 140  136 - 145 mmol/L Final   • Potassium 04/04/2018 4.7  3.5 - 5.2 mmol/L Final   • Chloride 04/04/2018 99  98 - 107 mmol/L Final   • CO2 04/04/2018 30.0* 22.0 - 29.0 mmol/L Final   • Calcium 04/04/2018 9.6  8.6 - 10.5 mg/dL Final   • eGFR Non African Amer 04/04/2018 98  >60 mL/min/1.73 Final   • BUN/Creatinine Ratio 04/04/2018 18.2  7.0 - 25.0 Final   • Anion Gap 04/04/2018 11.0  mmol/L Final   • WBC 04/04/2018 8.63  4.50 - 10.70 10*3/mm3 Final   • RBC 04/04/2018 3.99* 4.60 - 6.00 10*6/mm3 Final   • Hemoglobin 04/04/2018 11.9* 13.7 - 17.6 g/dL Final   • Hematocrit 04/04/2018 38.6* 40.4 - 52.2 % Final   • MCV 04/04/2018 96.7* 79.8 - 96.2 fL Final   • MCH 04/04/2018 29.8  27.0 - 32.7 pg Final   • MCHC 04/04/2018 30.8* 32.6 - 36.4 g/dL Final   • RDW 04/04/2018 14.9* 11.5 - 14.5 % Final   • RDW-SD 04/04/2018 52.8  37.0 - 54.0 fl Final   • MPV 04/04/2018 9.0  6.0 - 12.0 fL Final   • Platelets 04/04/2018 415  140 - 500 10*3/mm3 Final   • Color, UA 04/04/2018 Yellow  Yellow, Straw Final   • Appearance, UA 04/04/2018 Cloudy* Clear Final   • pH, UA 04/04/2018 7.0  5.0 - 8.0 Final   • Specific Gravity, UA 04/04/2018 1.016  1.005 - 1.030 Final   • Glucose, UA 04/04/2018 Negative  Negative Final   • Ketones, UA 04/04/2018 Negative  Negative Final   • Bilirubin, UA 04/04/2018 Negative  Negative Final   • Blood, UA 04/04/2018 Negative  Negative Final   • Protein, UA 04/04/2018 Negative  Negative Final   • Leuk Esterase, UA 04/04/2018 Negative  Negative Final   • Nitrite, UA 04/04/2018 Negative  Negative Final   • Urobilinogen, UA 04/04/2018 1.0 E.U./dL  0.2 - 1.0 E.U./dL Final       Imaging was done previously in the office, images were personally viewed and discussed at length with the patient:    Indication: pain related symptoms,  Views: 2V AP&LAT right hip(s)   Findings: severe end-stage arthritis (bone on bone, subchondral sclerosis/cysts, osteophytes)  Comparison views: viewed last xray done in the  office.        Assessment:  Patient Active Problem List   Diagnosis   • Hypertension   • Anxiety   • Osteoarthritis   • Seizure disorder   • Low back pain radiating to both legs   • Avascular necrosis of bones of both hips   • Arthritis of right hip   • Arthritis of left hip   • Degenerative disc disease, lumbar   • Chronic pain of both knees   • Pain in both knees   • Right inguinal hernia       Plan:  Dr. George Gonzalez reviewed anatomy of a total joint arthroplasty in laymen's terms, as well as typical postoperative recovery and possibly 6-12 months for maximal recovery, and possible need for rehabilitation stay after hospitalization. We also discussed risks, benefits, alternatives, and limitations of procedure with risks including but not limited to neurovascular damage, bleeding, infection, malalignment, chronic pian, failure of implants, osteolysis, loosening of implants, loss of motion, weakness, stiffness, instability, DVT, pulmonary embolus, death, stroke, complex regional pain syndrome, myocardial infarction, and need for additional procedures. Concept of substitution vs. replacement discussed.  No guarantees were given regarding results of surgery.      Dong Cevallos was given the opportunity to ask and have all questions answered today.  The patient voiced understanding of the risks, benefits, and alternative forms of treatment that were discussed and the patient consents to proceed with surgery.     Patient's blood clot history is negative.  Planned DVT prophylaxis for surgery:  Aspirin has caused bleeding in rectal fissures in the past. Will order Xarelto x 6 weeks due to hx of prostate CA and stroke.    Discharge Plan: POD 2-3 to home and home health, just had hernia surgery and did well with that and is cleared for hip replacement.     Patient was seen by MIRELLA Em in the office today.    Date: 4/6/2018  MIRELLA White

## 2018-04-09 NOTE — TELEPHONE ENCOUNTER
Let him know we will keep him overnight and see how he does with PT tomorrow if does well will dc to home tomorrow afternoon. RDUI

## 2018-04-09 NOTE — ANESTHESIA POSTPROCEDURE EVALUATION
"Patient: Dong Cevallos    Procedure Summary     Date:  04/09/18 Room / Location:  [facility] OR [address] [place] [address] MAIN OR    Anesthesia Start:  1231 Anesthesia Stop:  1442    Procedure:  RIGHT TOTAL HIP ARTHROPLASTY WITH CHRISTOPH NAVIGATION (Right Hip) Diagnosis:       Arthritis of right hip      (Arthritis of right hip [M16.11])    Surgeon:  George Gonzalez MD Provider:  Casper Perdue MD    Anesthesia Type:  general ASA Status:  2          Anesthesia Type: general  Last vitals  BP   144/77 (04/09/18 1535)   Temp   36.7 °C (98.1 °F) (04/09/18 1535)   Pulse   86 (04/09/18 1535)   Resp   14 (04/09/18 1535)     SpO2   97 % (04/09/18 1535)     Post Anesthesia Care and Evaluation    Patient location during evaluation: PACU  Patient participation: complete - patient participated  Level of consciousness: awake  Pain management: adequate  Airway patency: patent  Anesthetic complications: No anesthetic complications    Cardiovascular status: acceptable  Respiratory status: acceptable  Hydration status: acceptable    Comments: /77   Pulse 86   Temp 36.7 °C (98.1 °F) (Oral)   Resp 14   Ht 175.3 cm (69\")   Wt 78.4 kg (172 lb 13.5 oz)   SpO2 97%   BMI 25.52 kg/m²       "

## 2018-04-09 NOTE — ANESTHESIA PROCEDURE NOTES
Airway  Urgency: elective    Date/Time: 4/9/2018 12:40 PM  Airway not difficult    General Information and Staff    Patient location during procedure: OR  Anesthesiologist: AKILAH HOWARD  CRNA: HAYDE SMITH    Indications and Patient Condition  Indications for airway management: airway protection    Preoxygenated: yes  Mask difficulty assessment: 1 - vent by mask    Final Airway Details  Final airway type: endotracheal airway      Successful airway: ETT  Cuffed: yes   Successful intubation technique: direct laryngoscopy  Facilitating devices/methods: intubating stylet  Endotracheal tube insertion site: oral  Blade: Amaro  Blade size: #2  ETT size: 7.5 mm  Cormack-Lehane Classification: grade IIa - partial view of glottis  Placement verified by: chest auscultation and capnometry   Cuff volume (mL): 8  Measured from: teeth  ETT to teeth (cm): 21  Number of attempts at approach: 1

## 2018-04-09 NOTE — ANESTHESIA PREPROCEDURE EVALUATION
Anesthesia Evaluation     Patient summary reviewed and Nursing notes reviewed   history of anesthetic complications:               Airway   Mallampati: II  Dental      Pulmonary - negative pulmonary ROS   Cardiovascular     ECG reviewed  Rhythm: regular  Rate: normal    (+) hypertension,       Neuro/Psych  (+) psychiatric history Anxiety and Depression,     GI/Hepatic/Renal/Endo - negative ROS     Musculoskeletal (-) negative ROS    Abdominal    Substance History - negative use     OB/GYN negative ob/gyn ROS         Other                        Anesthesia Plan    ASA 2     general     intravenous induction   Anesthetic plan and risks discussed with patient.

## 2018-04-09 NOTE — OP NOTE
Operative Note  Name: Dong Cevallos  YOB: 1940  MRN: [id number]  BMI: Body mass index is 25.52 kg/m².    DATE OF SURGERY: 4/9/2018    PREOPERATIVE DIAGNOSIS:  right hip end-stage osteoarthritis    POSTOPERATIVE DIAGNOSIS: Same.    PROCEDURE PERFORMED:  right Total hip replacement with [place] navigation    SURGEON:  George Gonzalez M.D.    ASSISTANT:  MIRELLA Em    IMPLANTS:  Depuy Tri-Lock size 6hi,52 mm cup, 36, +4 10° liner, 36+1.5ceramic head.    Anesthesia: Gen. and local  IV fluid: 1500 crystalloid  Blood loss: 400 cc  Specimen: None  Drain: None  Complications: None   22 Modifier:  none    Description:    Dong Cevallos is a 77 y.o.-year-old male with end-stage osteoarthritis of the hip, who presents today for surgery having failed nonoperative therapy.  This patient was brought into the operating room and placed in the supine position on the operative table. Surgical time out was performed.  Gen. anesthetic was induced, the patient was given IV antibiotics, and was placed in a lateral decubitus position with padding and an axillary roll. The operative lower extremity was prepped with Betadine scrub and paint, and draped with sterile drapes including an Ioban. 2 stab wounds were made above the iliac crest anterior superior iliac spine. Two 5 mm half pins were placed in the iliac crest and navigation protocol was undertaken.    A 3-4 inch incision was based in the skin posteriorly. Dissection was carried down to the fascia which was opened. Retractors were placed. The sciatic nerve was identified and avoided. The short external rotators were identified and the capsulotomy was performed. The hip was dislocated and femoral neck cut was made. It was made in line with the template of just less than a fingerbreadth. Acetabular soft tissues were removed and retractors were placed. Acetabular navigation was achieved. Was then reamed sequentially up to fitted size. A trial was placed. It  was reamed in approximately 40° of abduction and 20° of anteversion. Bone grafts were applied and appropriate size cup was selected and impacted to affix the cup. The final position was 42° of abduction and 22° of anteversion.    Femoral canal was opened with a . The femur was sequentially broached up to appropriate size using the Tri-Lock system and good fit and fill.  Offset was selected using the preoperative template for starting point and tried to correct fit. Leg length was restored or slightly lengthened according the navigation which was in line with the preoperative template. Trial showed equivalent leg lengths, no undue tightness, no undue shock, and good approximation of soft tissues. The hip was stable anteriorly and 90° of flexion you could internally rotated to 80-85° before it started to come out.    Bony surfaces were irrigated and the Tri-Lock was impacted with gentle blows of the mallet. Once again trialed and found to do best with the chosen liner. It reproduced stability and leg length and offset of the trials. The ceramic femoral head was then applied and taken through final range of motion checked all of which were excellent. The wound was dry at the time of surgical conclusion. Ends examined casted and orthopedic mix were infiltrated. The final irrigation was achieved with all 3 L of irrigation fluid. The short external rotators were repaired in an interrupted fashion with a good closure of the capsule and short external rotators. The fascia was closed in an interrupted fashion in a watertight manner. Multilayered closure was placed in the subcutaneous fat which measured 3-4 inches. This required additional time and skill the skin was closed with a v lock suture. Surgical glue was applied and sterile dressings were applied as well. Findings needle and instrument counts reported to be correct. No complications noted but x-rays ordered for the recovery area.     Surgical assistant  performed retraction, suction, hemostasis, and specific limb positioning and manipulation required for accuracy of joint placement, and assistance in wound closure and dressing application.     Dr. George Gonzalez dictating an operative note.    4/9/2018

## 2018-04-09 NOTE — PLAN OF CARE
Problem: Patient Care Overview  Goal: Plan of Care Review  Outcome: Ongoing (interventions implemented as appropriate)   04/09/18 5421   Coping/Psychosocial   Plan of Care Reviewed With patient   Plan of Care Review   Progress improving   OTHER   Outcome Summary Pt s/p right SAMIA. Vitals WNL, dressing CDI. Pain controlled with PO meds. Will continue to monitor BP r/t history of HTN.     Goal: Individualization and Mutuality  Outcome: Ongoing (interventions implemented as appropriate)    Goal: Discharge Needs Assessment  Outcome: Ongoing (interventions implemented as appropriate)    Goal: Interprofessional Rounds/Family Conf  Outcome: Ongoing (interventions implemented as appropriate)      Problem: Fall Risk (Adult)  Goal: Identify Related Risk Factors and Signs and Symptoms  Outcome: Outcome(s) achieved Date Met: 04/09/18    Goal: Absence of Fall  Outcome: Ongoing (interventions implemented as appropriate)      Problem: Hip Arthroplasty (Total, Partial) (Adult)  Goal: Signs and Symptoms of Listed Potential Problems Will be Absent, Minimized or Managed (Hip Arthroplasty)  Outcome: Ongoing (interventions implemented as appropriate)    Goal: Anesthesia/Sedation Recovery  Outcome: Ongoing (interventions implemented as appropriate)

## 2018-04-09 NOTE — TELEPHONE ENCOUNTER
----- Message from Dong Cevallos sent at 4/6/2018  9:15 PM EDT -----  Regarding: Visit Follow-Up Question  Contact: [phone]    MY CHART MESSAGE:    Great visit today!  You are a delight to work with!    After hip replacement surgery, how long will I be in the hospital?

## 2018-04-09 NOTE — NURSING NOTE
When patient turned lateral on OR bed, blanching redness noted to coccyx and buttock with skin intact; healing incision to groin; scrotum red and swollen; small area of blanching redness to right inner thigh; skin intact

## 2018-04-10 ENCOUNTER — TELEPHONE (OUTPATIENT)
Dept: ORTHOPEDIC SURGERY | Facility: CLINIC | Age: 78
End: 2018-04-10

## 2018-04-10 VITALS
TEMPERATURE: 98.9 F | OXYGEN SATURATION: 93 % | SYSTOLIC BLOOD PRESSURE: 140 MMHG | HEART RATE: 78 BPM | WEIGHT: 172.84 LBS | HEIGHT: 69 IN | BODY MASS INDEX: 25.6 KG/M2 | RESPIRATION RATE: 18 BRPM | DIASTOLIC BLOOD PRESSURE: 66 MMHG

## 2018-04-10 LAB
ANION GAP SERPL CALCULATED.3IONS-SCNC: 13.7 MMOL/L
BUN BLD-MCNC: 14 MG/DL (ref 8–23)
BUN/CREAT SERPL: 19.4 (ref 7–25)
CALCIUM SPEC-SCNC: 9 MG/DL (ref 8.6–10.5)
CHLORIDE SERPL-SCNC: 98 MMOL/L (ref 98–107)
CO2 SERPL-SCNC: 27.3 MMOL/L (ref 22–29)
CREAT BLD-MCNC: 0.72 MG/DL (ref 0.76–1.27)
GFR SERPL CREATININE-BSD FRML MDRD: 106 ML/MIN/1.73
GLUCOSE BLD-MCNC: 128 MG/DL (ref 65–99)
HCT VFR BLD AUTO: 35 % (ref 40.4–52.2)
HGB BLD-MCNC: 10.4 G/DL (ref 13.7–17.6)
POTASSIUM BLD-SCNC: 4.1 MMOL/L (ref 3.5–5.2)
SODIUM BLD-SCNC: 139 MMOL/L (ref 136–145)

## 2018-04-10 PROCEDURE — 99024 POSTOP FOLLOW-UP VISIT: CPT | Performed by: ORTHOPAEDIC SURGERY

## 2018-04-10 PROCEDURE — 97165 OT EVAL LOW COMPLEX 30 MIN: CPT

## 2018-04-10 PROCEDURE — 97161 PT EVAL LOW COMPLEX 20 MIN: CPT | Performed by: PHYSICAL THERAPIST

## 2018-04-10 PROCEDURE — 97162 PT EVAL MOD COMPLEX 30 MIN: CPT | Performed by: PHYSICAL THERAPIST

## 2018-04-10 PROCEDURE — 97535 SELF CARE MNGMENT TRAINING: CPT

## 2018-04-10 PROCEDURE — 97110 THERAPEUTIC EXERCISES: CPT | Performed by: PHYSICAL THERAPIST

## 2018-04-10 PROCEDURE — G8979 MOBILITY GOAL STATUS: HCPCS | Performed by: PHYSICAL THERAPIST

## 2018-04-10 PROCEDURE — 85018 HEMOGLOBIN: CPT | Performed by: ORTHOPAEDIC SURGERY

## 2018-04-10 PROCEDURE — 25010000003 CEFAZOLIN IN DEXTROSE 2-4 GM/100ML-% SOLUTION: Performed by: ORTHOPAEDIC SURGERY

## 2018-04-10 PROCEDURE — 85014 HEMATOCRIT: CPT | Performed by: ORTHOPAEDIC SURGERY

## 2018-04-10 PROCEDURE — 80048 BASIC METABOLIC PNL TOTAL CA: CPT | Performed by: ORTHOPAEDIC SURGERY

## 2018-04-10 PROCEDURE — G8978 MOBILITY CURRENT STATUS: HCPCS | Performed by: PHYSICAL THERAPIST

## 2018-04-10 PROCEDURE — G8980 MOBILITY D/C STATUS: HCPCS | Performed by: PHYSICAL THERAPIST

## 2018-04-10 RX ORDER — ONDANSETRON 4 MG/1
4 TABLET, FILM COATED ORAL EVERY 6 HOURS PRN
Qty: 20 TABLET | Refills: 2 | Status: SHIPPED | OUTPATIENT
Start: 2018-04-10 | End: 2018-04-20

## 2018-04-10 RX ORDER — NYSTATIN 100000 [USP'U]/G
POWDER TOPICAL EVERY 12 HOURS SCHEDULED
Qty: 56 G | Refills: 3 | Status: SHIPPED | OUTPATIENT
Start: 2018-04-10 | End: 2018-04-10

## 2018-04-10 RX ORDER — ONDANSETRON 4 MG/1
4 TABLET, FILM COATED ORAL EVERY 6 HOURS PRN
Qty: 20 TABLET | Refills: 2 | Status: SHIPPED | OUTPATIENT
Start: 2018-04-10 | End: 2018-04-10

## 2018-04-10 RX ORDER — PSEUDOEPHEDRINE HCL 30 MG
100 TABLET ORAL 2 TIMES DAILY
Qty: 60 EACH | Refills: 2 | Status: SHIPPED | OUTPATIENT
Start: 2018-04-10 | End: 2018-04-20

## 2018-04-10 RX ORDER — BISACODYL 5 MG/1
10 TABLET, DELAYED RELEASE ORAL DAILY PRN
Qty: 30 TABLET | Refills: 3 | Status: SHIPPED | OUTPATIENT
Start: 2018-04-10 | End: 2018-04-10

## 2018-04-10 RX ORDER — NYSTATIN 100000 [USP'U]/G
POWDER TOPICAL EVERY 12 HOURS SCHEDULED
Status: DISCONTINUED | OUTPATIENT
Start: 2018-04-10 | End: 2018-04-10 | Stop reason: HOSPADM

## 2018-04-10 RX ORDER — OXYCODONE AND ACETAMINOPHEN 7.5; 325 MG/1; MG/1
TABLET ORAL
Qty: 100 TABLET | Refills: 0 | OUTPATIENT
Start: 2018-04-10 | End: 2018-05-18

## 2018-04-10 RX ORDER — BISACODYL 5 MG/1
10 TABLET, DELAYED RELEASE ORAL DAILY PRN
Qty: 30 TABLET | Refills: 3 | Status: SHIPPED | OUTPATIENT
Start: 2018-04-10 | End: 2018-04-20

## 2018-04-10 RX ORDER — NYSTATIN 100000 [USP'U]/G
POWDER TOPICAL EVERY 12 HOURS SCHEDULED
Qty: 56 G | Refills: 3 | Status: SHIPPED | OUTPATIENT
Start: 2018-04-10 | End: 2018-04-20

## 2018-04-10 RX ORDER — PSEUDOEPHEDRINE HCL 30 MG
100 TABLET ORAL 2 TIMES DAILY
Qty: 60 CAPSULE | Refills: 2 | Status: SHIPPED | OUTPATIENT
Start: 2018-04-10 | End: 2018-04-10

## 2018-04-10 RX ADMIN — NYSTATIN: 100000 POWDER TOPICAL at 12:47

## 2018-04-10 RX ADMIN — MUPIROCIN: 20 OINTMENT TOPICAL at 07:52

## 2018-04-10 RX ADMIN — OXYCODONE HYDROCHLORIDE AND ACETAMINOPHEN 1 TABLET: 7.5; 325 TABLET ORAL at 06:40

## 2018-04-10 RX ADMIN — POLYETHYLENE GLYCOL 3350 17 G: 17 POWDER, FOR SOLUTION ORAL at 07:51

## 2018-04-10 RX ADMIN — CEFAZOLIN SODIUM 2 G: 2 INJECTION, SOLUTION INTRAVENOUS at 09:28

## 2018-04-10 RX ADMIN — DOCUSATE SODIUM 100 MG: 100 CAPSULE, LIQUID FILLED ORAL at 07:52

## 2018-04-10 RX ADMIN — OXYCODONE HYDROCHLORIDE AND ACETAMINOPHEN 1 TABLET: 7.5; 325 TABLET ORAL at 12:46

## 2018-04-10 NOTE — DISCHARGE SUMMARY
Orthopedic Discharge Summary      Patient: Dong Cevallos  YOB: 1940  Medical Record Number: [id number]    Attending Physician: George Gonzalez MD    Date of Admission: 4/9/2018 10:23 AM  Date of Discharge: 4/10/18    Discharge Diagnosis: RIGHT TOTAL HIP ARTHROPLASTY CHRISTOPH NAVIGATION,   Acute Blood Loss Anemia, stable  Post-operative Pain  Limited mobility, requires use of walker and assistance when OOB.    Presenting Problem/History of Present Illness: Arthritis of right hip [M16.11]  Arthritis of right hip [M16.11]        Allergies:   Allergies   Allergen Reactions   • Asa [Aspirin] Other (See Comments)     Had bleeding in the past from rectal fissures, but is not severe and can tolerate aspirin for safety following surgery. Discussed with patient.        Discharge Medications     Dong Cevallos   Home Medication Instructions MAYO:[id number]    Printed on:04/10/18 1135   Medication Information                      aspirin 325 MG EC tablet  Take 1 tablet by mouth 2 (Two) Times a Day for 83 doses.             bisacodyl (DULCOLAX) 5 MG EC tablet  Take 2 tablets by mouth Daily As Needed for Constipation.             docusate sodium 100 MG capsule  Take 100 mg by mouth 2 (Two) Times a Day.             nystatin (MYCOSTATIN) 535873 UNIT/GM powder  Apply  topically Every 12 (Twelve) Hours. to rash in groin area             ondansetron (ZOFRAN) 4 MG tablet  Take 1 tablet by mouth Every 6 (Six) Hours As Needed for Nausea or Vomiting.             oxyCODONE-acetaminophen (PERCOCET) 7.5-325 MG per tablet  1-2 tabs po q 3-4 hr prn severe pain, wean as tolerated             polyethylene glycol (MIRALAX) pack packet  Take 17 g by mouth Daily.                   Past Medical History:   Diagnosis Date   • Bilateral hip pain    • History of anxiety    • History of prostate cancer    • History of seizures     NONE SINCE 9/2013   • Osteoarthritis    • PONV (postoperative nausea and vomiting)    • Stroke 2008    • Unsteady gait         Past Surgical History:   Procedure Laterality Date   • APPENDECTOMY     • ENDOSCOPY     • INGUINAL HERNIA REPAIR Right 3/9/2018    Procedure: RIGHT INGUINAL HERNIA REPAIR WITH MESH;  Surgeon: Matt Zamora Jr., MD;  Location: [facility];  Service: General   • PROSTATECTOMY     • TOTAL HIP ARTHROPLASTY Right 4/9/2018    Procedure: RIGHT TOTAL HIP ARTHROPLASTY WITH CHRISTOPH NAVIGATION;  Surgeon: George Gonzalez MD;  Location: [facility];  Service: Orthopedics        Social History     Occupational History   • Not on file.     Social History Main Topics   • Smoking status: Never Smoker   • Smokeless tobacco: Never Used   • Alcohol use No   • Drug use: No   • Sexual activity: Not on file    Social History     Social History Narrative   • No narrative on file        Family History   Problem Relation Age of Onset   • Malig Hyperthermia Neg Hx          Physical Exam: 77 y.o. male   Body mass index is 25.52 kg/m².  Facility age limit for growth percentiles is 20 years.  Vitals:    04/10/18 0700   BP: 152/73   Pulse: 78   Resp: 18   Temp: 98.9 °F (37.2 °C)   SpO2: 92%         General Appearance:    Alert, cooperative, in no acute distress                    Vitals:    04/09/18 1900 04/09/18 2300 04/10/18 0300 04/10/18 0700   BP: 140/77 149/76 152/76 152/73   BP Location: Right arm Right arm Right arm Right arm   Patient Position: Lying Lying Lying Sitting   Pulse: 102 84 80 78   Resp: 16 16 16 18   Temp: 97.6 °F (36.4 °C) 98.5 °F (36.9 °C) 98.2 °F (36.8 °C) 98.9 °F (37.2 °C)   TempSrc: Oral   Oral   SpO2: 96% 96% 96% 92%   Weight:       Height:            DIAGNOSTIC TESTS:   Admission on 04/09/2018   Component Date Value Ref Range Status   • Glucose 04/10/2018 128* 65 - 99 mg/dL Final   • BUN 04/10/2018 14  8 - 23 mg/dL Final   • Creatinine 04/10/2018 0.72* 0.76 - 1.27 mg/dL Final   • Sodium 04/10/2018 139  136 - 145 mmol/L Final   • Potassium 04/10/2018 4.1  3.5 - 5.2 mmol/L Final   •  Chloride 04/10/2018 98  98 - 107 mmol/L Final   • CO2 04/10/2018 27.3  22.0 - 29.0 mmol/L Final   • Calcium 04/10/2018 9.0  8.6 - 10.5 mg/dL Final   • eGFR Non African Amer 04/10/2018 106  >60 mL/min/1.73 Final   • BUN/Creatinine Ratio 04/10/2018 19.4  7.0 - 25.0 Final   • Anion Gap 04/10/2018 13.7  mmol/L Final   • Hemoglobin 04/10/2018 10.4* 13.7 - 17.6 g/dL Final   • Hematocrit 04/10/2018 35.0* 40.4 - 52.2 % Final       Imaging Results (last 72 hours)     Procedure Component Value Units Date/Time    XR Hip 1 View Without Pelvis Right (Surgery Only) [[id number]] Collected:  04/09/18 1515     Updated:  04/09/18 1518    Narrative:       XR HIP 1 VIEW WO PELVIS RIGHT-     INDICATIONS: Postoperative evaluation.     TECHNIQUE:     Frontal view of the right hip     COMPARISON: None available     FINDINGS:      Intact appearing right hip arthroplasty hardware is seen with adjacent  surgical soft tissue gas. No acute fracture is identified.          Impression:          Postsurgical changes.           This report was finalized on 4/9/2018 3:15 PM by Dr. Noah Fang MD.             Hospital Course:  77 y.o. male admitted to [facility] to services of George Gonzalez MD with Arthritis of right hip [M16.11]  Arthritis of right hip [M16.11] on 4/9/2018 and underwent RIGHT TOTAL HIP ARTHROPLASTY CHRISTOPH NAVIGATION  Per George Gonzalez MD. Antibiotic and VTE prophylaxis were per [facility] protocols. Post-operatively the patient transferred to the post-operative floor where the patient underwent mobilization therapy that included active as well as passive ROM exercises. Opioids were titrated to achieve appropriate pain management to allow for participation in mobilization exercises. Vital signs are now stable. On the day of discharge the wound was clean, dry and intact and calf was soft and non tender and Homans sign was negative. Operative extremity neurovascular status remains intact.   Appropriate education re:  incision care, activity levels, medications, and follow up visits was completed and all questions were answered. The patient is now deemed stable for discharge.    Condition on Discharge:  Stable    Discharge Instructions: Patient is to continue with physical therapy exercises twice daily and continue working with the physical therapist as ordered. Patient may weight bear as tolerated unless otherwise specified. Continue to ice regularly. Patient instructed on frequent calf pumping exercises.  Patient also instructed on incentive spirometer during hospitalization and encouraged to continue to use at home regularly.  See wound care discharge instructions.    Follow up Instructions:  Follow up in the office with Dr. George Man in 2 weeks - patient to call the office at [phone] to schedule. Prescriptions were given for pain medication, constipation, and blood thinner therapy.    Follow-up Appointments  Future Appointments  Date Time Provider Department Center   4/20/2018 8:10 AM George Man MD [facility] None     Additional Instructions for the Follow-ups that You Need to Schedule     Ambulatory Referral to Home Health    As directed      Face to Face Visit Date:  4/9/2018    Follow-up Provider for Plan of Care?:  I will be treating the patient on an ongoing basis.  Please send me the Plan of Care for signature.    Follow-up Provider:  GEORGE MAN [[id number]]    Reason/Clinical Findings:  post operative pain with ambulation, requires use of walker for ambulation    Describe mobility limitations that make leaving home difficult:  requires assist of another to leave home    Nursing/Therapeutic Services Requested:  Skilled Nursing Physical Therapy    Skilled nursing orders:  Wound care dressing/changes    PT orders:  Total joint pathway    Frequency:  1 Week 1         Discharge Follow-up with Specified Provider: George Man M.D. at [facility] [phone]; 2 Weeks    As  directed      To:  George Gonzalez M.D. at [facility] [phone]    Follow Up:  2 Weeks         Dressing Change Instructions    As directed      IV. INCISION CARE: For Total Hip Replacement: Do not change dressing unless saturated.  On POD#5 May shower but keep dressing in place and pat dry.  May remove dressing on post op day #10 and shower:  the wound should be gently cleaned with antibacterial soap then allowed to dry. Do NOT scrub the glue at the incision site, pat dry after shower. If there is any drainage, the wound should be covered at all times until drainage has stopped and incision is sealed off.  Call if any unusual drainage past POD#5, pus, redness, or swelling.     General Care TIPS:  Wash your hands prior to dressing changes  Change the dressing as needed to keep incision clean and dry. Utilize dry gauze and paper tape. Avoid touching the side of the gauze that goes against the incision with your hands.  No creams or ointments to the incision  May remove dressing once the incision is free of drainage  Do not touch or pick at the incision  Check incision every day and notify surgeon immediately if any of the following signs or symptoms are noted:  Increase in redness  Increase in swelling around the incision and of the entire extremity  Increase in pain  Drainage oozing from the incision  Pulling apart of the edges of the incision  Increase in overall body temperature (greater than 100.5 degrees) Make sure you are doing your incentive spirometer and deep breathing exercises every day while awake as this is the most frequent cause of low grade fever post operatively.      V. Medications:   1. Anticoagulants: You will be discharged on an anticoagulant. This is a prophylactic medication that helps prevent blood clots during your post-operative period. The type and length of dosage varies based on your individual needs, procedure performed, and surgeon's preference.  While  taking the anticoagulant, you should avoid taking any additional aspirin, ibuprofen (Advil or Motrin), Aleve (Naprosyn) or other non-steroidal anti-inflammatory medications.   Notify surgeon immediately if any marlene bleeding is noted in the urine, stool, emesis, or from the nose or the incision. Blood in the stool will often appear as black rather than red. Blood in urine may appear as pink. Blood in emesis may appear as brown/black like coffee grounds.  You will need to apply pressure for longer periods of time to any cuts or abrasions to stop bleeding  Avoid alcohol while taking anticoagulants    2. Stool Softeners: You will be at greater risk of constipation after surgery due to being less mobile and the pain medications.   Take over the counter stool softeners (such as colace, Milk of Magnesia, dulcolax, miralax, etc.) as needed to mainatin bowel movements while on pain medications. Over the counter Miralax 1-2 times daily, or Colace 100 mg 1-2 capsules twice daily to start with and add additional as needed.   If stools become too loose or too frequent, please decreases the dosage or stop the stool softener.  If constipation occurs despite use of stool softeners, you are to continue the stool softeners and add a laxative (Milk of Magnesia 1 ounce daily as needed)  Drink plenty of fluids, and eat fruits and vegetables during your recovery time    3. Pain Medications utilized after surgery are narcotics and the law requires that the following information be given to all patients that are prescribed narcotics:  CLASSIFICATION: Pain medications are called Opioids and are narcotics  LEGALITIES: It is illegal to share narcotics with others and to drive within 24 hours of taking narcotics  POTENTIAL SIDE EFFECTS: Potential side effects of opioids include: nausea, vomiting, itching, dizziness, drowsiness, dry mouth, constipation, and difficulty urinating.  POTENTIAL ADVERSE EFFECTS:   Opioid tolerance can develop with  use of pain medications and this simply means that it requires more and more of the medication to control pain; however, this is seen more in patients that use opioids for longer periods of time.  Opioid dependence can develop with use of Opioids and this simply means that to stop the medication can cause withdrawal symptoms, so wean off gradually when the pain lessens; however, this is seen with patients that use Opioids for longer periods of time.  Opioid addiction can develop with use of Opioids and the incidence of this is very unlikely in patients who take the medications as ordered and stop the medications as instructed.  Opioid overdose can be dangerous, but is unlikely when the medication is taken as ordered and stopped when ordered. It is important not to mix opioids with alcohol or with and type of sedative such as Benadryl as this can lead to over sedation and respiratory difficulty.  DOSAGE:   Pain medications will need to be taken consistently for the first week to decrease pain and promote adequate pain relief and participation in physical therapy.  After the initial surgical pain begins to resolve, you may begin to decrease the pain medication. By the end of 6-8 weeks, you should be off of pain medications.  Refills will not be given by the office during evening hours, on weekends, or after 8 weeks post-op.  To seek refills on pain medications during the initial 6 week post-operative period, you must call the office 48 hours in advance to request the refill. The office will then notify you when to  the prescription. DO NOT wait until you are out of the medication to request a refill.    V. FOLLOW-UP VISITS:  If you have any concerns or suspected complications prior to your follow up visit, please call Dr. Gonzalez's office at [phone]. Do not wait until your appointment time if you suspect complications. These will need to be addressed in the office promptly.    Order Comments:  IV. INCISION  CARE: For Total Hip Replacement: Do not change dressing unless saturated.  On POD#5 May shower but keep dressing in place and pat dry.  May remove dressing on post op day #10 and shower:  the wound should be gently cleaned with antibacterial soap then allowed to dry. Do NOT scrub the glue at the incision site, pat dry after shower. If there is any drainage, the wound should be covered at all times until drainage has stopped and incision is sealed off.  Call if any unusual drainage past POD#5, pus, redness, or swelling.  General Care TIPS: Wash your hands prior to dressing changes Change the dressing as needed to keep incision clean and dry. Utilize dry gauze and paper tape. Avoid touching the side of the gauze that goes against the incision with your hands. No creams or ointments to the incision May remove dressing once the incision is free of drainage Do not touch or pick at the incision Check incision every day and notify surgeon immediately if any of the following signs or symptoms are noted: Increase in redness Increase in swelling around the incision and of the entire extremity Increase in pain Drainage oozing from the incision Pulling apart of the edges of the incision Increase in overall body temperature (greater than 100.5 degrees) Make sure you are doing your incentive spirometer and deep breathing exercises every day while awake as this is the most frequent cause of low grade fever post operatively. V. Medications: 1. Anticoagulants: You will be discharged on an anticoagulant. This is a prophylactic medication that helps prevent blood clots during your post-operative period. The type and length of dosage varies based on your individual needs, procedure performed, and surgeon's preference. While taking the anticoagulant, you should avoid taking any additional aspirin, ibuprofen (Advil or Motrin), Aleve (Naprosyn) or other non-steroidal anti-inflammatory medications. Notify surgeon immediately if any marlene  bleeding is noted in the urine, stool, emesis, or from the nose or the incision. Blood in the stool will often appear as black rather than red. Blood in urine may appear as pink. Blood in emesis may appear as brown/black like coffee grounds. You will need to apply pressure for longer periods of time to any cuts or abrasions to stop bleeding Avoid alcohol while taking anticoagulants 2. Stool Softeners: You will be at greater risk of constipation after surgery due to being less mobile and the pain medications. Take over the counter stool softeners (such as colace, Milk of Magnesia, dulcolax, miralax, etc.) as needed to mainatin bowel movements while on pain medications. Over the counter Miralax 1-2 times daily, or Colace 100 mg 1-2 capsules twice daily to start with and add additional as needed. If stools become too loose or too frequent, please decreases the dosage or stop the stool softener. If constipation occurs despite use of stool softeners, you are to continue the stool softeners and add a laxative (Milk of Magnesia 1 ounce daily as needed) Drink plenty of fluids, and eat fruits and vegetables during your recovery time 3. Pain Medications utilized after surgery are narcotics and the law requires that the following information be given to all patients that are prescribed narcotics: CLASSIFICATION: Pain medications are called Opioids and are narcotics LEGALITIES: It is illegal to share narcotics with others and to drive within 24 hours of taking narcotics POTENTIAL SIDE EFFECTS: Potential side effects of opioids include: nausea, vomiting, itching, dizziness, drowsiness, dry mouth, constipation, and difficulty urinating. POTENTIAL ADVERSE EFFECTS: Opioid tolerance can develop with use of pain medications and this simply means that it requires more and more of the medication to control pain; however, this is seen more in patients that use opioids for longer periods of time. Opioid dependence can develop with use of  Opioids and this simply means that to stop the medication can cause withdrawal symptoms, so wean off gradually when the pain lessens; however, this is seen with patients that use Opioids for longer periods of time. Opioid addiction can develop with use of Opioids and the incidence of this is very unlikely in patients who take the medications as ordered and stop the medications as instructed. Opioid overdose can be dangerous, but is unlikely when the medication is taken as ordered and stopped when ordered. It is important not to mix opioids with alcohol or with and type of sedative such as Benadryl as this can lead to over sedation and respiratory difficulty. DOSAGE: Pain medications will need to be taken consistently for the first week to decrease pain and promote adequate pain relief and participation in physical therapy. After the initial surgical pain begins to resolve, you may begin to decrease the pain medication. By the end of 6-8 weeks, you should be off of pain medications. Refills will not be given by the office during evening hours, on weekends, or after 8 weeks post-op. To seek refills on pain medications during the initial 6 week post-operative period, you must call the office 48 hours in advance to request the refill. The office will then notify you when to  the prescription. DO NOT wait until you are out of the medication to request a refill. V. FOLLOW-UP VISITS: If you have any concerns or suspected complications prior to your follow up visit, please call Dr. Gonzalez's office at [phone]. Do not wait until your appointment time if you suspect complications. These will need to be addressed in the office promptly.                Discharge Disposition Plan:today to home, home health and when cleared by physical therapy as safe for discharge    Date: 4/10/2018    George Gonzalez MD

## 2018-04-10 NOTE — PLAN OF CARE
Problem: Patient Care Overview  Goal: Plan of Care Review   04/10/18 1341   OTHER   Outcome Summary Pt. was (I) with using AE to (A) with LE dressing. Pt. to order AE from internet when he is at home.

## 2018-04-10 NOTE — PLAN OF CARE
Problem: Patient Care Overview  Goal: Plan of Care Review  Outcome: Ongoing (interventions implemented as appropriate)   04/10/18 2508   Coping/Psychosocial   Plan of Care Reviewed With patient   Plan of Care Review   Progress improving   OTHER   Outcome Summary POD# 1 S/P total right hip. Patient A/O x4. Vitals stable. Dressing dry and intact. NO c/o pain . Patient was assisted to chair . Tolerated well... ambulated few steps in room with assist x2. Tolerated well. Voiding without difficulty in urinal. Vascular checks WNL. Educated patient on importance of monitoring blood pressure given h/o hypertension, verbalises understanding.     Goal: Individualization and Mutuality  Outcome: Ongoing (interventions implemented as appropriate)    Goal: Discharge Needs Assessment  Outcome: Ongoing (interventions implemented as appropriate)    Goal: Interprofessional Rounds/Family Conf  Outcome: Ongoing (interventions implemented as appropriate)      Problem: Fall Risk (Adult)  Goal: Absence of Fall  Outcome: Ongoing (interventions implemented as appropriate)      Problem: Hip Arthroplasty (Total, Partial) (Adult)  Goal: Signs and Symptoms of Listed Potential Problems Will be Absent, Minimized or Managed (Hip Arthroplasty)  Outcome: Ongoing (interventions implemented as appropriate)

## 2018-04-10 NOTE — THERAPY DISCHARGE NOTE
Acute Care - Physical Therapy Initial Eval/Discharge  [facility]     Patient Name: Dong Cevallos  : 1940  MRN: [id number]  Today's Date: 4/10/2018                Admit Date: 2018    Visit Dx:    ICD-10-CM ICD-9-CM   1. Arthritis of right hip M16.11 716.95     Patient Active Problem List   Diagnosis   • Hypertension   • Anxiety   • Osteoarthritis   • Seizure disorder   • Low back pain radiating to both legs   • Avascular necrosis of bones of both hips   • Arthritis of right hip   • Arthritis of left hip   • Degenerative disc disease, lumbar   • Chronic pain of both knees   • Pain in both knees   • Right inguinal hernia     Past Medical History:   Diagnosis Date   • Bilateral hip pain    • History of anxiety    • History of prostate cancer    • History of seizures     NONE SINCE 2013   • Osteoarthritis    • PONV (postoperative nausea and vomiting)    • Stroke 2008   • Unsteady gait      Past Surgical History:   Procedure Laterality Date   • APPENDECTOMY     • ENDOSCOPY     • INGUINAL HERNIA REPAIR Right 3/9/2018    Procedure: RIGHT INGUINAL HERNIA REPAIR WITH MESH;  Surgeon: Matt Zamora Jr., MD;  Location: [facility];  Service: General   • PROSTATECTOMY     • TOTAL HIP ARTHROPLASTY Right 2018    Procedure: RIGHT TOTAL HIP ARTHROPLASTY WITH CHRISTOPH NAVIGATION;  Surgeon: George Gonzalez MD;  Location: [facility];  Service: Orthopedics          PT ASSESSMENT (last 12 hours)      Physical Therapy Evaluation     Row Name 04/10/18 0858          PT Evaluation Time/Intention    Subjective Information no complaints  -     Document Type evaluation  -     Mode of Treatment physical therapy  -     Patient Effort excellent  -     Symptoms Noted During/After Treatment none  -     Row Name 04/10/18 0858          General Information    Patient Observations alert;cooperative;agree to therapy  -     General Observations of Patient In bed  -KH     Prior Level of Function independent:  -KH      Equipment Currently Used at Home walker, rolling  -     Pertinent History of Current Functional Problem s/p R SAMIA  -     Existing Precautions/Restrictions fall;hip, posterior  -     Equipment Ordered for Patient bedside commode  -     Row Name 04/10/18 0858          Relationship/Environment    Lives With alone  -     Row Name 04/10/18 0858          Resource/Environmental Concerns    Current Living Arrangements home/apartment/condo  -     Row Name 04/10/18 0858          Cognitive Assessment/Interventions    Additional Documentation Cognitive Assessment/Intervention (Group)  -     Row Name 04/10/18 0858          Cognitive Assessment/Intervention- PT/OT    Orientation Status (Cognition) oriented x 4  -KH     Follows Commands (Cognition) WNL  -     Personal Safety Interventions fall prevention program maintained;gait belt;nonskid shoes/slippers when out of bed  -     Row Name 04/10/18 0858          Mobility Assessment/Treatment    Extremity Weight-bearing Status right lower extremity  -     Right Lower Extremity (Weight-bearing Status) weight-bearing as tolerated (WBAT)  -     Row Name 04/10/18 0858          Bed Mobility Assessment/Treatment    Bed Mobility Assessment/Treatment bed mobility (all) activities;supine-sit;sit-supine  -     Supine-Sit [place] (Bed Mobility) contact guard;verbal cues  -     Sit-Supine [place] (Bed Mobility) contact guard;verbal cues  -     Row Name 04/10/18 0858          Transfer Assessment/Treatment    Transfer Assessment/Treatment sit-stand transfer;stand-sit transfer  -     Sit-Stand [place] (Transfers) contact guard  -     Stand-Sit [place] (Transfers) contact guard  -     Row Name 04/10/18 0858          Sit-Stand Transfer    Assistive Device (Sit-Stand Transfers) walker, front-wheeled  -     Row Name 04/10/18 0858          Stand-Sit Transfer    Assistive Device (Stand-Sit Transfers) walker, front-wheeled  -     Row Name  04/10/18 0858          Gait/Stairs Assessment/Training    Gait/Stairs Assessment/Training gait/ambulation independence  -     [place] Level (Gait) contact guard  -     Assistive Device (Gait) walker, front-wheeled  -     Distance in Feet (Gait) 100  -KH     Pattern (Gait) step-to  -KH     Deviations/Abnormal Patterns (Gait) antalgic;stride length decreased;barb decreased  -     Comment (Gait/Stairs) No stairs at home  -     Row Name 04/10/18 0858          General ROM    GENERAL ROM COMMENTS WFL except R hip  -[facility] Name 04/10/18 0858          General Assessment (Manual Muscle Testing)    Comment, General Manual Muscle Testing (MMT) Assessment WFL  -     Row Name 04/10/18 0858          Motor Assessment/Intervention    Additional Documentation Therapeutic Exercise Interventions (Group)  -[facility] Name 04/10/18 0858          Therapeutic Exercise    Comment (Therapeutic Exercise) R SAMIA protocol x 10 reps  -[facility] Name 04/10/18 0858          Pain Assessment    Additional Documentation Pain Scale: Numbers Pre/Post-Treatment (Group)  -[facility] Name 04/10/18 0858          Pain Scale: Numbers Pre/Post-Treatment    Pain Scale: Numbers, Pretreatment 0/10 - no pain  -     Row Name             Wound 04/09/18 1330 Right hip incision    Wound - Properties Group Date first assessed: 04/09/18  -BM Time first assessed: 1330  -BM Side: Right  -BM Location: hip  -BM Type: incision  -BM    Row Name 04/10/18 0858          Plan of Care Review    Plan of Care Reviewed With patient  -[facility] Name 04/10/18 0858          Physical Therapy Clinical Impression    Patient/Family Goals Statement (PT Clinical Impression) return home  -     Criteria for Skilled Interventions Met (PT Clinical Impression) treatment indicated  -     Impairments Found (describe specific impairments) gait, locomotion, and balance;ROM  -     Rehab Potential (PT Clinical Summary) good, to achieve stated therapy goals  -KH        User Key  (r) = Recorded By, (t) = Taken By, (c) = Cosigned By    Initials Name Provider Type     Esther Douglas PT Physical Therapist    ABBY Concepcion RN Registered Nurse          Physical Therapy Education     Title: PT OT SLP Therapies (Done)     Topic: Physical Therapy (Done)     Point: Mobility training (Done)    Learning Progress Summary     Learner Status Readiness Method Response Comment Documented by    Patient Done Acceptance KELUANA MARIE,NR   04/10/18 0905          Point: Home exercise program (Done)    Learning Progress Summary     Learner Status Readiness Method Response Comment Documented by    Patient Done Acceptance LUANA DEMPSEY,NR   04/10/18 0905          Point: Body mechanics (Done)    Learning Progress Summary     Learner Status Readiness Method Response Comment Documented by    Patient Done Acceptance LUANA DEMPSEY,[facility] 04/10/18 0905          Point: Precautions (Done)    Learning Progress Summary     Learner Status Readiness Method Response Comment Documented by    Patient Done Acceptance KELUANA MARIE,NR   04/10/18 0905                      User Key     Initials Effective Dates Name Provider Type [facility] 05/18/15 -  Esther Douglas PT Physical Therapist PT                PT Recommendation and Plan  Anticipated Discharge Disposition (PT): home with home health care  Therapy Frequency (PT Clinical Impression): evaluation only  Outcome Summary/Treatment Plan (PT)  Anticipated Discharge Disposition (PT): home with home health care  Plan of Care Reviewed With: patient  Outcome Summary: Pt is s/p R SAMIA and is ambulating well. Pt was educated on HEP, post-op precautions and home safety. Pt performed HEP independently and verbalized understanding of education. PT is safe to d/c home when medically stable. PT will sign off.           Outcome Measures     Row Name 04/10/18 1000             How much help from another person do you currently need...    Turning from your back to your side  while in flat bed without using bedrails? 3  -KH      Moving from lying on back to sitting on the side of a flat bed without bedrails? 3  -KH      Moving to and from a bed to a chair (including a wheelchair)? 3  -KH      Standing up from a chair using your arms (e.g., wheelchair, bedside chair)? 3  -KH      Climbing 3-5 steps with a railing? 3  -KH      To walk in hospital room? 3  -KH      AM-PAC 6 Clicks Score 18  -KH         Functional Assessment    Outcome Measure Options AM-PAC 6 Clicks Basic Mobility (PT)  -        User Key  (r) = Recorded By, (t) = Taken By, (c) = Cosigned By    Initials Name Provider Type    GIANCARLO Douglas PT Physical Therapist           Time Calculation:         PT Charges     Row Name 04/10/18 0857             Time Calculation    Start Time 0852  -KH      Stop Time 0938  -KH      Time Calculation (min) 46 min  -KH      PT Received On 04/10/18  -        User Key  (r) = Recorded By, (t) = Taken By, (c) = Cosigned By    Initials Name Provider Type    GIANCARLO Douglas, PT Physical Therapist          Therapy Charges for Today     Code Description Service Date Service Provider Modifiers Qty    71904983824 HC PT EVAL LOW COMPLEXITY 1 4/10/2018 Esther Douglas, PT GP 1    30183029447 HC PT THER PROC EA 15 MIN 4/10/2018 Esther Douglas, PT GP 2    46752777514 HC PT MOBILITY CURRENT 4/10/2018 Esther Douglas, PT GP, CK 1    72111002389 HC PT MOBILITY PROJECTED 4/10/2018 Esther Douglas, PT GP, CK 1    11309277502 HC PT MOBILITY DISCHARGE 4/10/2018 Esther Douglas, PT GP, CK 1          PT G-Codes  Outcome Measure Options: AM-PAC 6 Clicks Basic Mobility (PT)  Score: 18  Functional Limitation: Mobility: Walking and moving around  Mobility: Walking and Moving Around Current Status (): At least 40 percent but less than 60 percent impaired, limited or restricted  Mobility: Walking and Moving Around Goal Status (): At least 40 percent  but less than 60 percent impaired, limited or restricted  Mobility: Walking and Moving Around Discharge Status (): At least 40 percent but less than 60 percent impaired, limited or restricted    PT Discharge Summary  Anticipated Discharge Disposition (PT): home with home health care    Esther Douglas, PT  4/10/2018

## 2018-04-10 NOTE — THERAPY EVALUATION
Acute Care - Occupational Therapy Initial Evaluation  [facility]     Patient Name: Dong Cevallos  : 1940  MRN: [id number]  Today's Date: 4/10/2018             Admit Date: 2018       ICD-10-CM ICD-9-CM   1. Arthritis of right hip M16.11 716.95     Patient Active Problem List   Diagnosis   • Hypertension   • Anxiety   • Osteoarthritis   • Seizure disorder   • Low back pain radiating to both legs   • Avascular necrosis of bones of both hips   • Arthritis of right hip   • Arthritis of left hip   • Degenerative disc disease, lumbar   • Chronic pain of both knees   • Pain in both knees   • Right inguinal hernia     Past Medical History:   Diagnosis Date   • Bilateral hip pain    • History of anxiety    • History of prostate cancer    • History of seizures     NONE SINCE 2013   • Osteoarthritis    • PONV (postoperative nausea and vomiting)    • Stroke 2008   • Unsteady gait      Past Surgical History:   Procedure Laterality Date   • APPENDECTOMY     • ENDOSCOPY     • INGUINAL HERNIA REPAIR Right 3/9/2018    Procedure: RIGHT INGUINAL HERNIA REPAIR WITH MESH;  Surgeon: Matt Zamora Jr., MD;  Location: [facility];  Service: General   • PROSTATECTOMY     • TOTAL HIP ARTHROPLASTY Right 2018    Procedure: RIGHT TOTAL HIP ARTHROPLASTY WITH CHRISTOPH NAVIGATION;  Surgeon: George Gonzalez MD;  Location: [facility];  Service: Orthopedics          OT ASSESSMENT FLOWSHEET (last 72 hours)      Occupational Therapy Evaluation     Row Name 04/10/18 1300                   OT Evaluation Time/Intention    Subjective Information no complaints  -VS        Document Type evaluation  -VS        Mode of Treatment occupational therapy  -VS        Patient Effort excellent  -VS           General Information    Patient Profile Reviewed? yes  -VS        Patient Observations alert;cooperative;agree to therapy  -VS        General Observations of Patient in chair  -VS        Prior Level of Function independent:;ADL's  -VS         Equipment Currently Used at Home walker, rolling  -VS        Existing Precautions/Restrictions fall   no flex greater 90, no abducation WBAT with walker  -VS           Cognitive Assessment/Intervention- PT/OT    Orientation Status (Cognition) oriented x 4  -VS        Follows Commands (Cognition) WFL  -VS        Personal Safety Interventions fall prevention program maintained;gait belt;nonskid shoes/slippers when out of bed  -VS           Bed Mobility Assessment/Treatment    Comment (Bed Mobility) up in chair  -VS           Transfer Assessment/Treatment    Sit-Stand [place] (Transfers) verbal cues;contact guard  -VS        Stand-Sit [place] (Transfers) verbal cues;contact guard  -VS           ADL Assessment/Intervention    BADL Assessment/Intervention bathing;lower body dressing  -VS           Bathing Assessment/Intervention    Comment (Bathing) OT instructed the pt. in bathing tech with long handled sponge and Reacher  -VS           Lower Body Dressing Assessment/Training    Lower Body Dressing [place] Level don;pants/bottoms;socks  -VS        Assistive Devices (Lower Body Dressing) reacher;sock-aid  -VS        Lower Body Dressing Position unsupported sitting  -VS        Comment (Lower Body Dressing) PT. was (I) with sock aid, OT demonsytrated how to use reacher with pants, verbally understood  -VS           BADL Safety/Performance    Progress in BADL Status --   Pt. verbalized understanding on hip precaution and AE to (A)  -VS           Positioning and Restraints    Pre-Treatment Position sitting in chair/recliner  -VS        Post Treatment Position chair  -VS        In Chair sitting;call light within reach;encouraged to call for assist;with family/caregiver  -VS           Pain Scale: Numbers Pre/Post-Treatment    Pain Scale: Numbers, Pretreatment 0/10 - no pain  -VS           Wound 04/09/18 1330 Right hip incision    Wound - Properties Group Date first assessed: 04/09/18  -BM Time first  "assessed: 1330  -BM Side: Right  -BM Location: hip  -BM Type: incision  -BM       Plan of Care Review    Plan of Care Reviewed With patient  -VS           Clinical Impression (OT)    Functional Level at Time of Evaluation (OT Eval) (A) with ADLs due to hip precautions  -VS        Patient/Family Goals Statement (OT Eval) \"I want to go home (I), I will order the equipment I need on the internet\"  -VS        Criteria for Skilled Therapeutic Interventions Met (OT Eval) treatment indicated  -VS        Rehab Potential (OT Eval) good, to achieve stated therapy goals  -VS        Therapy Frequency (OT Eval) evaluation only  -VS        Care Plan Review (OT) evaluation/treatment results reviewed;care plan/treatment goals reviewed;patient/other agree to care plan  -VS           OT Goals    Dressing Goal Selection (OT) dressing, OT goal 1  -VS           Dressing Goal 1 (OT)    Activity/Assistive Device (Dressing Goal 1, OT) dressing skills, all;lower body dressing  -VS        [place]/Cues Needed (Dressing Goal 1, OT) conditional independence   pt. was able to demonstarte the use of AE, pt. to order AE  -VS        Time Frame (Dressing Goal 1, OT) 1 day  -VS        Progress/Outcome (Dressing Goal 1, OT) goal met  -VS          User Key  (r) = Recorded By, (t) = Taken By, (c) = Cosigned By    Initials Name Effective Dates    VS Mara Fang OTR 03/07/18 -     BM Michelle Concepcion RN 06/16/16 -            Occupational Therapy Education     Title: PT OT SLP Therapies (Done)     Topic: Occupational Therapy (Done)     Point: ADL training (Done)     Description: Instruct learner(s) on proper safety adaptation and remediation techniques during self care or transfers.   Instruct in proper use of assistive devices.   Learning Progress Summary     Learner Status Readiness Method Response Comment Documented by    Patient Done Acceptance E,TB,D FRANK,ANNABELLA Pt. was educated on Hip precaution, AE to (A) with ADLs and increasing (I) and " maintaining hip precautions at home with ADLs. VS 04/10/18 1344          Point: Precautions (Done)     Description: Instruct learner(s) on prescribed precautions during self-care and functional transfers.   Learning Progress Summary     Learner Status Readiness Method Response Comment Documented by    Patient Done Acceptance E,TB,ANNABELLA SOLITARIO Pt. was educated on Hip precaution, AE to (A) with ADLs and increasing (I) and maintaining hip precautions at home with ADLs. VS 04/10/18 1344                      User Key     Initials Effective Dates Name Provider Type Discipline    VS 03/07/18 -  PRADEEP Grimes Occupational Therapist OT                  OT Recommendation and Plan  Outcome Summary/Treatment Plan (OT)  Anticipated Discharge Disposition (OT): home or self care, home with home health  Therapy Frequency (OT Eval): evaluation only  Plan of Care Review  Plan of Care Reviewed With: patient  Plan of Care Reviewed With: patient  Outcome Summary: Pt. was (I) with using AE to (A) with LE dressing.  Pt. to order AE from internet when he is at home.            Outcome Measures     Row Name 04/10/18 1300 04/10/18 1000          How much help from another person do you currently need...    Turning from your back to your side while in flat bed without using bedrails?  -- 3  -KH     Moving from lying on back to sitting on the side of a flat bed without bedrails?  -- 3  -KH     Moving to and from a bed to a chair (including a wheelchair)?  -- 3  -KH     Standing up from a chair using your arms (e.g., wheelchair, bedside chair)?  -- 3  -KH     Climbing 3-5 steps with a railing?  -- 3  -KH     To walk in hospital room?  -- 3  -KH     AM-PAC 6 Clicks Score  -- 18  -KH        How much help from another is currently needed...    Putting on and taking off regular lower body clothing? 3  -VS  --     Bathing (including washing, rinsing, and drying) 3  -VS  --     Toileting (which includes using toilet bed pan or urinal) 3  -VS  --      Putting on and taking off regular upper body clothing 4  -VS  --     Taking care of personal grooming (such as brushing teeth) 4  -VS  --     Eating meals 4  -VS  --     Score 21  -VS  --        Functional Assessment    Outcome Measure Options AM-PAC 6 Clicks Daily Activity (OT)  -VS AM-PAC 6 Clicks Basic Mobility (PT)  -       User Key  (r) = Recorded By, (t) = Taken By, (c) = Cosigned By    Initials Name Provider Type     Esther Douglas, PT Physical Therapist    VS PRADEEP Grimes Occupational Therapist          Time Calculation:   OT Start Time: 1303  OT Stop Time: 1329  OT Time Calculation (min): 26 min    Therapy Charges for Today     Code Description Service Date Service Provider Modifiers Qty    91590879970  OT EVAL LOW COMPLEXITY 2 4/10/2018 PRADEEP Grimes GO 1    05889742982  OT SELF CARE/MGMT/TRAIN EA 15 MIN 4/10/2018 PRADEEP Grimes GO 1               PRADEEP Grimes  4/10/2018

## 2018-04-10 NOTE — PROGRESS NOTES
Continued Stay Note  [facility]     Patient Name: Dong Cevallos  MRN: [id number]  Today's Date: 4/10/2018    Admit Date: 4/9/2018          Discharge Plan     Row Name 04/10/18 1152       Plan    Plan [facility]    Patient/Family in Agreement with Plan yes    Plan Comments Pt selects [facility] for [facility]. CCP made referral to [facility]. PT ordered walker and bsc. No additional known needs. Scarlet Drummond LCSW              Discharge Codes    No documentation.       Expected Discharge Date and Time     Expected Discharge Date Expected Discharge Time    Apr 10, 2018             Latasha Drummond LCSW

## 2018-04-10 NOTE — TELEPHONE ENCOUNTER
Call return to the patient's son.  I he was discharged home from the hospital today after total hip replacement yesterday.  Did not do a lot of walking for many months prior to surgery because of his hip pain.'s have significant muscle weakness in both legs.  Patient was getting up off the toilet with the help of his daughter and his knees buckled he's slowly let himself down to the ground.  He had no injury but did have to have EMS to come and help picking back up to put him in a chair.  The son and daughter are both concerned about what they can do to assistance for their dad at home. EMS had recommended a gait belt and I did advise them that they could pick that up with [place].  He does have a walker and an elevated toilet seat at home.  Also has [facility] health and the therapist is scheduled to go out tomorrow to see him.  I have advised him that because of changes in insurance he does not qualify to go to a subacute rehabilitation Center.  We discussed in great detail that he could go to a subacute rehabilitation center however it would be private pay.  I have given him several agency names and numbers for him to contact to check into hiring help at home.  Have left it open for him to call if he has any other questions or concerns

## 2018-04-10 NOTE — PROGRESS NOTES
Case Management Discharge Note    Final Note: [facility] with son transporting. [facility] aware of d/c    Destination     No service coordination in this encounter.      Durable Medical Equipment     No service coordination in this encounter.      Dialysis/Infusion     No service coordination in this encounter.      Home Medical Care - Selection Complete     Service Request Status Selected Specialties Address Phone Number Fax Number    [facility] Selected Home Health Services [address] [address] [phone]      Social Care     No service coordination in this encounter.        Other: Other (private auto)    Final Discharge Disposition Code: 06 - home with home health care

## 2018-04-10 NOTE — PLAN OF CARE
Problem: Patient Care Overview  Goal: Plan of Care Review   04/10/18 0944   OTHER   Outcome Summary Pt is s/p R SAMIA and is ambulating well. Pt was educated on HEP, post-op precautions and home safety. Pt performed HEP independently and verbalized understanding of education. PT is safe to d/c home when medically stable. PT will sign off.

## 2018-04-10 NOTE — PROGRESS NOTES
Discharge Planning Assessment  [facility]     Patient Name: Dong Cevallos  MRN: [id number]  Today's Date: 4/10/2018    Admit Date: 4/9/2018          Discharge Needs Assessment     Row Name 04/10/18 1259       Living Environment    Lives With alone    Current Living Arrangements home/apartment/condo    Primary Care Provided by self    Provides Primary Care For no one    Family Caregiver if Needed grandchild(estefany), adult    Family Caregiver Names Manjinder Cevallos, son    Quality of Family Relationships helpful;involved;supportive    Able to Return to Prior Arrangements yes       Resource/Environmental Concerns    Resource/Environmental Concerns none    Transportation Concerns car, none       Transition Planning    Patient/Family Anticipates Transition to home with help/services    Patient/Family Anticipated Services at Transition home health care    Transportation Anticipated family or friend will provide       Discharge Needs Assessment    Concerns to be Addressed discharge planning    Equipment Currently Used at Home rollator    Equipment Needed After Discharge walker, rolling;commode    Outpatient/Agency/Support Group Needs homecare agency    Discharge Facility/Level of Care Needs home with home health    Discharge Coordination/Progress [facility]            Discharge Plan     Row Name 04/10/18 1300       Plan    Plan [facility]    Patient/Family in Agreement with Plan yes    Plan Comments IMM letter checked. CCP reviewed pre-operative ortho assessment completed by Anayeli Read RN (2/26/18). CCP met with pt to confirm d/c plan. HORVATH notice given. Pt confirms plan to d/c home and requests referral to [facility]. CCP made referral to [facility] who is aware of d/c today. PT ordered walker and bedside commode for pt. Pt's son to transport pt home. CCP to follow to assist should additional d/c needs arise. Scarlet Drummond LCSW    Row Name 04/10/18 1157       Plan    Plan [facility]     Patient/Family in Agreement with Plan yes    Plan Comments Pt [facility] for [facility]. CCP made referral to [facility]. PT ordered walker and bsc. No additional known needs. Scarlet Drummond LCSW        Destination     No service coordination in this encounter.      Durable Medical Equipment     No service coordination in this encounter.      Dialysis/Infusion     No service coordination in this encounter.      Home Medical Care - Selection Complete     Service Request Status Selected Specialties Address Phone Number Fax Number    [facility] Health Services [address] [facility] [address] [phone]      Social Care     No service coordination in this encounter.        Expected Discharge Date and Time     Expected Discharge Date Expected Discharge Time    Apr 10, 2018               Demographic Summary     Row Name 04/10/18 1258       General Information    Admission Type inpatient    Arrived From home    Required Notices Provided Important Message from Medicare    Referral Source nursing;physician    Reason for Consult discharge planning    Preferred Language English            Functional Status     Row Name 04/10/18 1259       Functional Status    Usual Activity Tolerance excellent    Current Activity Tolerance moderate       Functional Status, IADL    Medications independent    Meal Preparation independent    Housekeeping independent    Laundry independent    Shopping independent       Mental Status Summary    Recent Changes in Mental Status/Cognitive Functioning no changes            Psychosocial    No documentation.           Abuse/Neglect    No documentation.           Legal    No documentation.           Substance Abuse    No documentation.           Patient Forms    No documentation.         Latasha Drummond LCSW

## 2018-04-10 NOTE — PROGRESS NOTES
"    Orthopedic Total Joint Progress Note        Patient: Dong Cevallos    Date of Admission: 4/9/2018 10:23 AM    YOB: 1940    Medical Record Number: [id number]    Attending Physician: George Gonzalez MD      POD # 1 Day Post-Op Procedure(s) (LRB):  RIGHT TOTAL HIP ARTHROPLASTY WITH CHRISTOPH NAVIGATION (Right)       Systemic or Specific Complaints: No Complaints      Allergies:   Allergies   Allergen Reactions   • Asa [Aspirin] Other (See Comments)     Had bleeding in the past from rectal fissures, but is not severe and can tolerate aspirin for safety following surgery. Discussed with patient.        Medications:   Current Medications:  Scheduled Meds:  aspirin 325 mg Oral Daily   ceFAZolin 2 g Intravenous Q8H   docusate sodium 100 mg Oral BID   mupirocin  Each Nare BID   nystatin  Topical Q12H   polyethylene glycol 17 g Oral Daily   Scopolamine 1 patch Transdermal Q72H     Continuous Infusions:  lactated ringers 9 mL/hr Last Rate: 9 mL/hr (04/09/18 1106)   lactated ringers 100 mL/hr Last Rate: 100 mL/hr (04/09/18 1745)     PRN Meds:.•  acetaminophen **OR** acetaminophen **OR** acetaminophen  •  acetaminophen  •  bisacodyl  •  bisacodyl  •  diphenhydrAMINE **OR** diphenhydrAMINE  •  HYDROmorphone **AND** naloxone  •  magnesium hydroxide  •  ondansetron **OR** ondansetron ODT **OR** ondansetron  •  oxyCODONE-acetaminophen  •  oxyCODONE-acetaminophen  •  promethazine      Physical Exam: 77 y.o. male Wt Readings from Last 3 Encounters:   04/09/18 78.4 kg (172 lb 13.5 oz)   04/06/18 74.4 kg (164 lb)   04/04/18 74.5 kg (164 lb 3.2 oz)     Ht Readings from Last 3 Encounters:   04/09/18 175.3 cm (69\")   04/06/18 177.8 cm (70\")   04/04/18 177.8 cm (70\")     Body mass index is 25.52 kg/m².  Facility age limit for growth percentiles is 20 years.    General Appearance:    alert and oriented            Pain Relief: Patient reports good relief Vitals:    04/09/18 1900 04/09/18 2300 04/10/18 0300 04/10/18 0700 "   BP: 140/77 149/76 152/76 152/73   BP Location: Right arm Right arm Right arm Right arm   Patient Position: Lying Lying Lying Sitting   Pulse: 102 84 80 78   Resp: 16 16 16 18   Temp: 97.6 °F (36.4 °C) 98.5 °F (36.9 °C) 98.2 °F (36.8 °C) 98.9 °F (37.2 °C)   TempSrc: Oral   Oral   SpO2: 96% 96% 96% 92%   Weight:       Height:              HEENT:    Normocephalic, without obvious abnormality, atraumatic.          PERRLA; EOMI; Neck supple with trachea midline. No JVD.       Lungs:     Respirations regular, even and unlabored      Heart:    Regular rhythm and normal rate.   Abdomen:     Normal bowel sounds, soft non-tender, non-distended       Extremities:   Operative extremity neurovascular status intact. ROM intact.    Incision intact w/out signs or symptoms of infection.  No cyanosis, no calf tenderness     Pulses:     Pulses palpable and equal bilaterally     Skin:     Skin Warm/Dry w/out cyanosis     Activity: Mobilizing Per P.T.   Weight Bearing: As Tolerated    Diagnostic Tests:   Admission on 04/09/2018   Component Date Value Ref Range Status   • Glucose 04/10/2018 128* 65 - 99 mg/dL Final   • BUN 04/10/2018 14  8 - 23 mg/dL Final   • Creatinine 04/10/2018 0.72* 0.76 - 1.27 mg/dL Final   • Sodium 04/10/2018 139  136 - 145 mmol/L Final   • Potassium 04/10/2018 4.1  3.5 - 5.2 mmol/L Final   • Chloride 04/10/2018 98  98 - 107 mmol/L Final   • CO2 04/10/2018 27.3  22.0 - 29.0 mmol/L Final   • Calcium 04/10/2018 9.0  8.6 - 10.5 mg/dL Final   • eGFR Non African Amer 04/10/2018 106  >60 mL/min/1.73 Final   • BUN/Creatinine Ratio 04/10/2018 19.4  7.0 - 25.0 Final   • Anion Gap 04/10/2018 13.7  mmol/L Final   • Hemoglobin 04/10/2018 10.4* 13.7 - 17.6 g/dL Final   • Hematocrit 04/10/2018 35.0* 40.4 - 52.2 % Final       Imaging Results (last 72 hours)     Procedure Component Value Units Date/Time    XR Hip 1 View Without Pelvis Right (Surgery Only) [[id number]] Collected:  04/09/18 1515     Updated:  04/09/18 1510     Narrative:       XR HIP 1 VIEW WO PELVIS RIGHT-     INDICATIONS: Postoperative evaluation.     TECHNIQUE:     Frontal view of the right hip     COMPARISON: None available     FINDINGS:      Intact appearing right hip arthroplasty hardware is seen with adjacent  surgical soft tissue gas. No acute fracture is identified.          Impression:          Postsurgical changes.           This report was finalized on 4/9/2018 3:15 PM by Dr. Noah Fang MD.             Personally viewed ortho images and report     Assessment:  Doing well POD  # 1 Day Post-Op following total joint replacement  Acute Blood Loss Anemia, stable  Post-operative Pain  Limited mobility, requires use of walker and assistance when OOB.    Patient Active Problem List   Diagnosis   • Hypertension   • Anxiety   • Osteoarthritis   • Seizure disorder   • Low back pain radiating to both legs   • Avascular necrosis of bones of both hips   • Arthritis of right hip   • Arthritis of left hip   • Degenerative disc disease, lumbar   • Chronic pain of both knees   • Pain in both knees   • Right inguinal hernia        Plan:  Continue to monitor labs and/or v/s, for tolerance to post op blood loss.  Continue efforts to increase mobilization.  Continue Pain Control Measures.  Continue incisional Care.  DVT prophylaxis.  Follow up in office with George Gonzalez M.D. In 2 weeks.    Discharge Plan:if progressing well to home, home health and when cleared by physical therapy as safe for discharge    Date: 4/10/2018  George Gonzalez MD  Seen today by George Gonzalez M.D. and MIRELLA mE

## 2018-04-10 NOTE — PLAN OF CARE
Problem: Patient Care Overview  Goal: Plan of Care Review  Outcome: Ongoing (interventions implemented as appropriate)  Pt worked well with therapy. Ambulating to RR with walker and minimal assist x1. VSS. Dressing CDI. NVI. Demonstrated use of IS. Pain is controlled with minimal pain medication. Family at bedside. No co morbidities to address at this time. Pt to be d/c'd home with hh. Will cont to monitor.    04/10/18 0304 04/10/18 1300   Coping/Psychosocial   Plan of Care Reviewed With --  patient   Plan of Care Review   Progress improving --      Goal: Individualization and Mutuality  Outcome: Ongoing (interventions implemented as appropriate)    Goal: Discharge Needs Assessment  Outcome: Ongoing (interventions implemented as appropriate)    Goal: Interprofessional Rounds/Family Conf  Outcome: Ongoing (interventions implemented as appropriate)      Problem: Fall Risk (Adult)  Goal: Identify Related Risk Factors and Signs and Symptoms  Outcome: Ongoing (interventions implemented as appropriate)    Goal: Absence of Fall  Outcome: Ongoing (interventions implemented as appropriate)      Problem: Hip Arthroplasty (Total, Partial) (Adult)  Goal: Signs and Symptoms of Listed Potential Problems Will be Absent, Minimized or Managed (Hip Arthroplasty)  Outcome: Ongoing (interventions implemented as appropriate)    Goal: Anesthesia/Sedation Recovery  Outcome: Ongoing (interventions implemented as appropriate)

## 2018-04-10 NOTE — TELEPHONE ENCOUNTER
Called.  Ems came and he seems to be fine.  Explained that he needs to have assistance and recommend a lift belt that the son said he would get.  He was marginal ambulator preop from his hips and needs to heal and rehab with the PT.

## 2018-04-20 ENCOUNTER — OFFICE VISIT (OUTPATIENT)
Dept: ORTHOPEDIC SURGERY | Facility: CLINIC | Age: 78
End: 2018-04-20

## 2018-04-20 VITALS — TEMPERATURE: 98.4 F | HEIGHT: 69 IN | BODY MASS INDEX: 25.48 KG/M2 | WEIGHT: 172 LBS

## 2018-04-20 DIAGNOSIS — Z96.641 STATUS POST TOTAL REPLACEMENT OF RIGHT HIP: ICD-10-CM

## 2018-04-20 DIAGNOSIS — Z96.641 HISTORY OF TOTAL RIGHT HIP ARTHROPLASTY: Primary | ICD-10-CM

## 2018-04-20 PROCEDURE — 73502 X-RAY EXAM HIP UNI 2-3 VIEWS: CPT | Performed by: ORTHOPAEDIC SURGERY

## 2018-04-20 PROCEDURE — 99024 POSTOP FOLLOW-UP VISIT: CPT | Performed by: ORTHOPAEDIC SURGERY

## 2018-04-20 NOTE — PROGRESS NOTES
Dong Cevallos : 1940 MRN: [id number] DATE: 2018  Body mass index is 25.4 kg/m².  Vitals:    18 1103   Temp: 98.4 °F (36.9 °C)         DIAGNOSIS: 2 week follow up right total hip     SUBJECTIVE:Patient returns today for 2 week follow up of right total hip replacement. Patient reports doing well with no unusual complaints. Appears to be progressing appropriately.    OBJECTIVE:   Exam:. The incision is healing appropriately. No sign of infection. Range of motion is progressing as expected. The calf is soft and nontender with a negative Homans sign.  Doing well.  Now his good hip   percocet tid.  Bandages dry.  Left hip severe arthritis  DIAGNOSTIC STUDIES  2V AP&Lat of the right hip were done in the office today  Indication, findings and comparison: images were reviewed for evaluation of recent hip replacement. They demonstrate a well positioned, well aligned hip replacement without complicating factors noted. In comparison with previous films there has been interval implant placement.    ASSESSMENT: 2 week status post right hip replacement expected healing.    PLAN: 1) Incision open to air.   2) Order given for PT and pain medicine (as needed)   3)Continue ice PRN   4) Continue EC Aspirin 325mg by mouth twice a day until 6 weeks post op.   5) Follow up in 4 weeks with repeat Xrays of right hip (2 views)   6) Continue with antibiotic prophylaxis with dental procedures for 2 yrs post total joint replacement. Wants to look at scheduling the left hip in a few months.  discussed    George Gonzalez MD  2018     Pain Medications utilized after surgery are narcotics and the law requires that the following information be given to all patients that are prescribed narcotics:    CLASSIFICATION: Pain medications are called Opioids and are narcotics  LEGALITIES: It is illegal to share narcotics with others and to drive within 4 hours of taking narcotics  POTENTIAL SIDE EFFECTS: Potential side effects  of opioids include: nausea, vomiting, itching, dizziness, drowsiness, dry mouth, constipation, and difficulty urinating.  POTENTIAL ADVERSE EFFECTS:   Opioid tolerance can develop with use of pain medications and this simply means that it requires more and more of the medication to control pain; however, this is seen more in patients that use opioids for longer periods of time.  Opioid dependence can develop with use of Opioids and this simply means that to stop the medication can cause withdrawal symptoms so wean gradually (do not stop all at once); however, this is seen more with patients that use opioids for longer periods of time.  Opioid addiction can develop with use of Opioids and the incidence of this is very unlikely in patients who take the medications as ordered and stop the medications as instructed.  Opioid overdose can be dangerous, but is unlikely when the medication is taken as ordered and stopped when ordered. It is important not to mix opioids with alcohol or with and type of sedative such as Benadryl as this can lead to over sedation and respiratory difficulty.    DOSAGE:   Pain medications will need to be taken consistently for the first week to decrease pain and promote adequate pain relief and participation in physical therapy.    After the initial surgical pain begins to resolve, you may begin to decrease the pain medication. By the end of 8 weeks, you should be off of pain medications.    Refills will not be given by the office during evening hours, on weekends.  To seek refills on pain medications during the initial 6 week post-operative period, you must call the office 48 hours in advance to request the refill. The office will then notify you when to  the prescription. DO NOT wait until you are out of the medication to request a refill.    A TAVO check will be made on-line, and will be repeated if prescription is renewed after a 90 day period. The patient agrees to adhering to the  medication regimen as prescribed.

## 2018-05-01 ENCOUNTER — TELEPHONE (OUTPATIENT)
Dept: ORTHOPEDIC SURGERY | Facility: CLINIC | Age: 78
End: 2018-05-01

## 2018-05-01 NOTE — TELEPHONE ENCOUNTER
"----- Message from Dong Cevallos sent at 5/1/2018  3:17 PM EDT -----  Regarding: Visit Follow-Up Question  Contact: [phone]  MY CHART MESSAGE:    New hip is now my \"good hip.\"  Unsure about precautions.  You said the new hip is \"extremely stable.\"  I'm erring on the side of caution in not stressing it.  Not wearing socks because I don't want to risk dislocating the hip putting on or taking off socks.  I've been having someone help me change underwear and pants; same concern.  Need some hard guidelines on what I can and can't do, now and later.  When, if ever, will I be able to change clothes without being concerned about the hip?  Looking for direction and assurance that I won't do something damaging out of ignorance.    Home health nurse removed the bandage and said that the incision looks great.    Will see you on the 18th.  I want to discuss when we can schedule left hip replacement.    "

## 2018-05-02 NOTE — TELEPHONE ENCOUNTER
Will you give him a call about hip precautions and let him know that we will discuss scheduling the L SAMIA when we see him on the 18th.  Thanks RUDI

## 2018-05-05 NOTE — TELEPHONE ENCOUNTER
Call returned to the patient.  Was unable to reach him, but did leave a message on his answering machine to contact me at the office

## 2018-05-07 ENCOUNTER — TELEPHONE (OUTPATIENT)
Dept: ORTHOPEDIC SURGERY | Facility: CLINIC | Age: 78
End: 2018-05-07

## 2018-05-07 ENCOUNTER — PREP FOR SURGERY (OUTPATIENT)
Dept: OTHER | Facility: HOSPITAL | Age: 78
End: 2018-05-07

## 2018-05-07 DIAGNOSIS — M16.12 ARTHRITIS OF LEFT HIP: Primary | ICD-10-CM

## 2018-05-07 RX ORDER — CEFAZOLIN SODIUM 2 G/100ML
2 INJECTION, SOLUTION INTRAVENOUS ONCE
Status: CANCELLED | OUTPATIENT
Start: 2018-06-13 | End: 2018-06-13

## 2018-05-07 NOTE — TELEPHONE ENCOUNTER
Called and spoke with Dong and answered questions, he would like to plan on doing Left hip in July, will order.

## 2018-05-18 ENCOUNTER — OFFICE VISIT (OUTPATIENT)
Dept: ORTHOPEDIC SURGERY | Facility: CLINIC | Age: 78
End: 2018-05-18

## 2018-05-18 VITALS — BODY MASS INDEX: 25.48 KG/M2 | HEIGHT: 69 IN | WEIGHT: 172 LBS | TEMPERATURE: 98 F

## 2018-05-18 DIAGNOSIS — Z96.641 STATUS POST TOTAL REPLACEMENT OF RIGHT HIP: Primary | ICD-10-CM

## 2018-05-18 PROCEDURE — 73502 X-RAY EXAM HIP UNI 2-3 VIEWS: CPT | Performed by: ORTHOPAEDIC SURGERY

## 2018-05-18 PROCEDURE — 99024 POSTOP FOLLOW-UP VISIT: CPT | Performed by: ORTHOPAEDIC SURGERY

## 2018-05-18 NOTE — PATIENT INSTRUCTIONS
Chief Complaint and HPI: 6 weeks follow up right total hip    SUBJECTIVE:Patient returns today for follow up of total joint replacement. Patient reports doing well with no unusual complaints. Appears to be progressing appropriately.    OBJECTIVE:   Exam:. The incision is healing appropriately. No sign of infection. Range of motion is progressing as expected. The calf is soft and nontender with a negative Homans sign.    DIAGNOSTIC STUDIES  Imaging done today, images were personally viewed and discussed with the patient:    Indication, findings and comparison: 2V AP & Lat of the operative joint were done in the office today  images were reviewed for evaluation of recent joint replacement. They demonstrate a well positioned, well aligned total joint replacement without complicating factors noted. In comparison with previous films there has been no alignment change.    ASSESSMENT: status post left total hip replacement expected healing.    PLAN: 1) Continue with PT exercises as prescribed   2) Schedule for left SAMIA   3) Continue with antibiotic prophylaxis with dental procedures for 2 yrs post total joint replacement.   4) May discontinue anticoagulant therapy (such as aspirin or xarelto) from surgery at this time and resume medications, vitamins, and supplements you were taking before surgery.     Cecile Verdugo, APRN  5/18/2018

## 2018-05-18 NOTE — PROGRESS NOTES
Dong Cevallos : 1940 MRN: [id number] DATE: 2018  Body mass index is 25.4 kg/m².  Vitals:    18 1113   Temp: 98 °F (36.7 °C)       Chief Complaint and HPI: 6 weeks follow up right total hip    SUBJECTIVE:Patient returns today for follow up of total joint replacement. Patient reports doing well with no unusual complaints. Appears to be progressing appropriately.    OBJECTIVE:   Exam:. The incision is healing appropriately. No sign of infection. Range of motion is progressing as expected. The calf is soft and nontender with a negative Homans sign.    DIAGNOSTIC STUDIES  Imaging done today, images were personally viewed and discussed with the patient:    Indication, findings and comparison: 2V AP & Lat of the operative joint were done in the office today  images were reviewed for evaluation of recent joint replacement. They demonstrate a well positioned, well aligned total joint replacement without complicating factors noted. In comparison with previous films there has been no alignment change.    ASSESSMENT: status post left total hip replacement expected healing.    PLAN: 1) Continue with PT exercises as prescribed   2) Schedule for left SAMIA as previously discussed.    3) Continue with antibiotic prophylaxis with dental procedures for 2 yrs post total joint replacement.   4) May discontinue anticoagulant therapy (such as aspirin or xarelto) from surgery at this time and resume medications, vitamins, and supplements you were taking before surgery.     Cecile Verdugo, APRN  2018

## 2018-06-05 ENCOUNTER — PREP FOR SURGERY (OUTPATIENT)
Dept: OTHER | Facility: HOSPITAL | Age: 78
End: 2018-06-05

## 2018-06-05 DIAGNOSIS — M16.12 PRIMARY OSTEOARTHRITIS OF LEFT HIP: Primary | ICD-10-CM

## 2018-06-06 ENCOUNTER — APPOINTMENT (OUTPATIENT)
Dept: PREADMISSION TESTING | Facility: HOSPITAL | Age: 78
End: 2018-06-06

## 2018-06-06 VITALS
TEMPERATURE: 98.3 F | WEIGHT: 164 LBS | OXYGEN SATURATION: 99 % | DIASTOLIC BLOOD PRESSURE: 94 MMHG | RESPIRATION RATE: 18 BRPM | SYSTOLIC BLOOD PRESSURE: 165 MMHG | BODY MASS INDEX: 23.48 KG/M2 | HEART RATE: 86 BPM | HEIGHT: 70 IN

## 2018-06-06 DIAGNOSIS — M16.12 ARTHRITIS OF LEFT HIP: ICD-10-CM

## 2018-06-06 DIAGNOSIS — M16.12 PRIMARY OSTEOARTHRITIS OF LEFT HIP: ICD-10-CM

## 2018-06-06 LAB
ANION GAP SERPL CALCULATED.3IONS-SCNC: 11.9 MMOL/L
BACTERIA UR QL AUTO: ABNORMAL /HPF
BILIRUB UR QL STRIP: NEGATIVE
BUN BLD-MCNC: 22 MG/DL (ref 8–23)
BUN/CREAT SERPL: 23.7 (ref 7–25)
CALCIUM SPEC-SCNC: 9.9 MG/DL (ref 8.6–10.5)
CHLORIDE SERPL-SCNC: 103 MMOL/L (ref 98–107)
CLARITY UR: ABNORMAL
CO2 SERPL-SCNC: 28.1 MMOL/L (ref 22–29)
COLOR UR: YELLOW
CREAT BLD-MCNC: 0.93 MG/DL (ref 0.76–1.27)
DEPRECATED RDW RBC AUTO: 60 FL (ref 37–54)
ERYTHROCYTE [DISTWIDTH] IN BLOOD BY AUTOMATED COUNT: 17.2 % (ref 11.5–14.5)
GFR SERPL CREATININE-BSD FRML MDRD: 79 ML/MIN/1.73
GLUCOSE BLD-MCNC: 91 MG/DL (ref 65–99)
GLUCOSE UR STRIP-MCNC: NEGATIVE MG/DL
HCT VFR BLD AUTO: 43.4 % (ref 40.4–52.2)
HGB BLD-MCNC: 13 G/DL (ref 13.7–17.6)
HGB UR QL STRIP.AUTO: NEGATIVE
HYALINE CASTS UR QL AUTO: ABNORMAL /LPF
KETONES UR QL STRIP: NEGATIVE
LEUKOCYTE ESTERASE UR QL STRIP.AUTO: ABNORMAL
MCH RBC QN AUTO: 28.6 PG (ref 27–32.7)
MCHC RBC AUTO-ENTMCNC: 30 G/DL (ref 32.6–36.4)
MCV RBC AUTO: 95.4 FL (ref 79.8–96.2)
NITRITE UR QL STRIP: NEGATIVE
PH UR STRIP.AUTO: 7.5 [PH] (ref 5–8)
PLATELET # BLD AUTO: 238 10*3/MM3 (ref 140–500)
PMV BLD AUTO: 9.3 FL (ref 6–12)
POTASSIUM BLD-SCNC: 4.1 MMOL/L (ref 3.5–5.2)
PROT UR QL STRIP: NEGATIVE
RBC # BLD AUTO: 4.55 10*6/MM3 (ref 4.6–6)
RBC # UR: ABNORMAL /HPF
REF LAB TEST METHOD: ABNORMAL
SODIUM BLD-SCNC: 143 MMOL/L (ref 136–145)
SP GR UR STRIP: 1.01 (ref 1–1.03)
SQUAMOUS #/AREA URNS HPF: ABNORMAL /HPF
UROBILINOGEN UR QL STRIP: ABNORMAL
WBC NRBC COR # BLD: 6.92 10*3/MM3 (ref 4.5–10.7)
WBC UR QL AUTO: ABNORMAL /HPF

## 2018-06-06 PROCEDURE — 87086 URINE CULTURE/COLONY COUNT: CPT | Performed by: ORTHOPAEDIC SURGERY

## 2018-06-06 PROCEDURE — 36415 COLL VENOUS BLD VENIPUNCTURE: CPT

## 2018-06-06 PROCEDURE — 85027 COMPLETE CBC AUTOMATED: CPT | Performed by: ORTHOPAEDIC SURGERY

## 2018-06-06 PROCEDURE — 80048 BASIC METABOLIC PNL TOTAL CA: CPT | Performed by: ORTHOPAEDIC SURGERY

## 2018-06-06 PROCEDURE — 81001 URINALYSIS AUTO W/SCOPE: CPT | Performed by: ORTHOPAEDIC SURGERY

## 2018-06-06 RX ORDER — MAGNESIUM 200 MG
1 TABLET ORAL DAILY
COMMUNITY

## 2018-06-06 RX ORDER — LANOLIN ALCOHOL/MO/W.PET/CERES
1000 CREAM (GRAM) TOPICAL DAILY
COMMUNITY

## 2018-06-06 ASSESSMENT — HOOS JR
HOOS JR SCORE: 13
HOOS JR SCORE: 49.858

## 2018-06-06 NOTE — DISCHARGE INSTRUCTIONS

## 2018-06-07 LAB — BACTERIA SPEC AEROBE CULT: NORMAL

## 2018-06-08 ENCOUNTER — OFFICE VISIT (OUTPATIENT)
Dept: ORTHOPEDIC SURGERY | Facility: CLINIC | Age: 78
End: 2018-06-08

## 2018-06-08 VITALS — BODY MASS INDEX: 23.48 KG/M2 | HEIGHT: 70 IN | TEMPERATURE: 99 F | WEIGHT: 164 LBS

## 2018-06-08 DIAGNOSIS — M16.12 ARTHRITIS OF LEFT HIP: Primary | ICD-10-CM

## 2018-06-08 PROCEDURE — S0260 H&P FOR SURGERY: HCPCS | Performed by: NURSE PRACTITIONER

## 2018-06-08 PROCEDURE — 73502 X-RAY EXAM HIP UNI 2-3 VIEWS: CPT | Performed by: NURSE PRACTITIONER

## 2018-06-08 RX ORDER — CEPHALEXIN 500 MG/1
500 CAPSULE ORAL EVERY 6 HOURS
Qty: 40 CAPSULE | Refills: 0 | Status: SHIPPED | OUTPATIENT
Start: 2018-06-08 | End: 2018-06-14 | Stop reason: HOSPADM

## 2018-06-08 NOTE — PROGRESS NOTES
"   History & Physical       Patient: Dong Cevallos  YOB: 1940  Medical Record Number: [id number]  Wt Readings from Last 3 Encounters:   06/08/18 74.4 kg (164 lb)   06/06/18 74.4 kg (164 lb)   05/18/18 78 kg (172 lb)     Ht Readings from Last 3 Encounters:   06/08/18 177.8 cm (70\")   06/06/18 177.8 cm (70\")   05/18/18 175.3 cm (69\")     Body mass index is 23.53 kg/m².  Facility age limit for growth percentiles is 20 years.    Surgeon:  Dr. George Gonzalez    Chief Complaints:   Chief Complaint   Patient presents with   • Left Hip - Pre-op Exam, Pain       Subjective:    History of Present Illness: 77 y.o. male presents with   Chief Complaint   Patient presents with   • Left Hip - Pre-op Exam, Pain   . Onset of symptoms was years ago and has been progressively worsening despite more conservative treatment measures.  Symptoms are associated with ability to move, exercise, and perform activities of daily living.  Symptoms are aggravated by weight bearing and ROM necessary for activities of daily living.   Symptoms improve with rest, ice and elevation only minimally.      Allergies:   Allergies   Allergen Reactions   • Asa [Aspirin] Other (See Comments)     Had bleeding in the past from rectal fissures, but is not severe and can tolerate aspirin for safety following surgery. Discussed with patient.        Medications:   Home Medications:  Current Outpatient Prescriptions on File Prior to Visit   Medication Sig   • Cholecalciferol (VITAMIN D3) 5000 units capsule capsule Take 5,000 Units by mouth Daily. 3 CAPS PER DAY LAST DOSE  6/6/16   • Cyanocobalamin (B-12) 1000 MCG sublingual tablet Place 1 tablet under the tongue Daily. LAST DOSE 6/6/18   • Multiple Vitamins-Minerals (MULTIVITAMIN ADULT PO) Take 1 tablet by mouth Daily. LAST DOSE 6/6/18   • Nutritional Supplements (COMPLETE PROTEIN/VITAMIN SHAKE PO) Take 8 oz by mouth Daily.   • Nutritional Supplements (ENSURE PLUS PO) Take 8 oz by mouth Daily.   • " Nutritional Supplements (NUTRITIONAL SHAKE PLUS PO) Take 8 oz by mouth 2 (Two) Times a Day.   • polyethylene glycol (MIRALAX) pack packet Take 17 g by mouth Daily. (Patient taking differently: Take 17 g by mouth As Needed.)   • vitamin B-12 (CYANOCOBALAMIN) 1000 MCG tablet Take 1,000 mcg by mouth Daily. 2 CAPS PER DAY-LAST DOSE 18     Current Facility-Administered Medications on File Prior to Visit   Medication   • [] mupirocin (BACTROBAN) 2 % nasal ointment     Current Medications:  Scheduled Meds:  Continuous Infusions:  No current facility-administered medications for this visit.   PRN Meds:.    I have reviewed the patient's medical history in detail and updated the computerized patient record.  Review and summarization of old records include:    Past Medical History:   Diagnosis Date   • Bilateral hip pain    • History of anxiety    • History of prostate cancer    • History of seizures     NONE SINCE 2013   • Hypertension     IN PAST- SINCE WEIGHT LOSS OF 77 LBS   • Muscle weakness     LEGS   • Osteoarthritis    • PONV (postoperative nausea and vomiting)     IN , AFTER PROSTATECTOMY   • Stroke    • Unsteady gait         Past Surgical History:   Procedure Laterality Date   • APPENDECTOMY     • COLONOSCOPY      ALL OK   • INGUINAL HERNIA REPAIR Right 3/9/2018    Procedure: RIGHT INGUINAL HERNIA REPAIR WITH MESH;  Surgeon: Matt Zamora Jr., MD;  Location: [facility];  Service: General   • PROSTATECTOMY     • TOTAL HIP ARTHROPLASTY Right 2018    Procedure: RIGHT TOTAL HIP ARTHROPLASTY WITH CHRISTOPH NAVIGATION;  Surgeon: George Gonzalez MD;  Location: [facility];  Service: Orthopedics        Social History     Occupational History   • Not on file.     Social History Main Topics   • Smoking status: Never Smoker   • Smokeless tobacco: Never Used   • Alcohol use No   • Drug use: No   • Sexual activity: Defer    Social History     Social History Narrative   • No narrative on file  "       Family History   Problem Relation Age of Onset   • Malig Hyperthermia Neg Hx        ROS: 14 point review of systems was performed and was negative except for documented findings in HPI and today's encounter.     Allergies:   Allergies   Allergen Reactions   • Asa [Aspirin] Other (See Comments)     Had bleeding in the past from rectal fissures, but is not severe and can tolerate aspirin for safety following surgery. Discussed with patient.      Constitutional:  Denies fever, shaking or chills   Eyes:  Denies change in visual acuity   HENT:  Denies nasal congestion or sore throat   Respiratory:  Denies cough or shortness of breath   Cardiovascular:  Denies chest pain or severe LE edema   GI:  Denies abdominal pain, nausea, vomiting, bloody stools or diarrhea   Musculoskeletal:  Denies numbness tingling or loss of motor function except as outlined above in history of present illness.  : Denies painful urination or hematuria  Integument:  Denies rash, lesion or ulceration   Neurologic:  Denies headache or focal weakness  Endocrine:  Denies lymphadenopathy  Psych:  Denies confusion or change in mental status   Hem:  Denies active bleeding    Physical Exam: 77 y.o. male  Wt Readings from Last 3 Encounters:   06/08/18 74.4 kg (164 lb)   06/06/18 74.4 kg (164 lb)   05/18/18 78 kg (172 lb)     Ht Readings from Last 3 Encounters:   06/08/18 177.8 cm (70\")   06/06/18 177.8 cm (70\")   05/18/18 175.3 cm (69\")     Body mass index is 23.53 kg/m².  Facility age limit for growth percentiles is 20 years.  Vitals:    06/08/18 1005   Temp: 99 °F (37.2 °C)       Vital signs reviewed.   General Appearance:    Alert, cooperative, in no acute distress                  Eyes: conjunctiva clear  ENT: external ears and nose atraumatic  CV: no peripheral edema  Resp: normal respiratory effort  Skin: no rashes or wounds; normal turgor  Psych: mood and affect appropriate  Lymph: no nodes appreciated  Neuro: gross sensation " intact  Vascular:  Palpable peripheral pulse in noted extremity  Musculoskeletal Extremities: HIP Exam: antalgic and stiff-legged gait with assistive device left hip, Stinchfield postitive, stiffness and MEERA test positive 2+ pedal pulses and brisk capillary refill Pedal edema none          Diagnostic Tests:  Appointment on 06/06/2018   Component Date Value Ref Range Status   • Glucose 06/06/2018 91  65 - 99 mg/dL Final   • BUN 06/06/2018 22  8 - 23 mg/dL Final   • Creatinine 06/06/2018 0.93  0.76 - 1.27 mg/dL Final   • Sodium 06/06/2018 143  136 - 145 mmol/L Final   • Potassium 06/06/2018 4.1  3.5 - 5.2 mmol/L Final   • Chloride 06/06/2018 103  98 - 107 mmol/L Final   • CO2 06/06/2018 28.1  22.0 - 29.0 mmol/L Final   • Calcium 06/06/2018 9.9  8.6 - 10.5 mg/dL Final   • eGFR Non  Amer 06/06/2018 79  >60 mL/min/1.73 Final   • BUN/Creatinine Ratio 06/06/2018 23.7  7.0 - 25.0 Final   • Anion Gap 06/06/2018 11.9  mmol/L Final   • WBC 06/06/2018 6.92  4.50 - 10.70 10*3/mm3 Final   • RBC 06/06/2018 4.55* 4.60 - 6.00 10*6/mm3 Final   • Hemoglobin 06/06/2018 13.0* 13.7 - 17.6 g/dL Final   • Hematocrit 06/06/2018 43.4  40.4 - 52.2 % Final   • MCV 06/06/2018 95.4  79.8 - 96.2 fL Final   • MCH 06/06/2018 28.6  27.0 - 32.7 pg Final   • MCHC 06/06/2018 30.0* 32.6 - 36.4 g/dL Final   • RDW 06/06/2018 17.2* 11.5 - 14.5 % Final   • RDW-SD 06/06/2018 60.0* 37.0 - 54.0 fl Final   • MPV 06/06/2018 9.3  6.0 - 12.0 fL Final   • Platelets 06/06/2018 238  140 - 500 10*3/mm3 Final   • Color, UA 06/06/2018 Yellow  Yellow, Straw Final   • Appearance, UA 06/06/2018 Cloudy* Clear Final   • pH, UA 06/06/2018 7.5  5.0 - 8.0 Final   • Specific Gravity, UA 06/06/2018 1.014  1.005 - 1.030 Final   • Glucose, UA 06/06/2018 Negative  Negative Final   • Ketones, UA 06/06/2018 Negative  Negative Final   • Bilirubin, UA 06/06/2018 Negative  Negative Final   • Blood, UA 06/06/2018 Negative  Negative Final   • Protein, UA 06/06/2018 Negative   Negative Final   • Leuk Esterase, UA 06/06/2018 Small (1+)* Negative Final   • Nitrite, UA 06/06/2018 Negative  Negative Final   • Urobilinogen, UA 06/06/2018 0.2 E.U./dL  0.2 - 1.0 E.U./dL Final   • RBC, UA 06/06/2018 0-2  None Seen, 0-2 /HPF Final   • WBC, UA 06/06/2018 6-12* None Seen, 0-2 /HPF Final   • Bacteria, UA 06/06/2018 None Seen  None Seen /HPF Final   • Squamous Epithelial Cells, UA 06/06/2018 0-2  None Seen, 0-2 /HPF Final   • Hyaline Casts, UA 06/06/2018 0-2  None Seen /LPF Final   • Methodology 06/06/2018 Automated Microscopy   Final   • Urine Culture 06/06/2018 >100,000 CFU/mL Mixed Avril Isolated   Final       Imaging was done today, images were personally viewed, viewed images and discussed with the patient:    Indication: pain related symptoms,  Views: 3V AP, LAT & 40 degree PA left hip(s)   Findings: severe end-stage arthritis (bone on bone, subchondral sclerosis/cysts, osteophytes), S/P right  Total Hip Replacement in good position and alignment  Comparison views: viewed last xray done in the office.     Assessment:  Patient Active Problem List   Diagnosis   • Hypertension   • Anxiety   • Osteoarthritis   • Seizure disorder   • Low back pain radiating to both legs   • Avascular necrosis of bones of both hips   • Arthritis of right hip   • Arthritis of left hip   • Degenerative disc disease, lumbar   • Chronic pain of both knees   • Pain in both knees   • Right inguinal hernia   • Status post total replacement of right hip       Plan:  Dr. George Gonzalez reviewed anatomy of a total joint arthroplasty in laymen's terms, as well as typical postoperative recovery and possibly 6-12 months for maximal recovery, and possible need for rehabilitation stay after hospitalization. We also discussed risks, benefits, alternatives, and limitations of procedure with risks including but not limited to neurovascular damage, bleeding, infection, malalignment, chronic pian, failure of implants, osteolysis,  loosening of implants, loss of motion, weakness, stiffness, instability, DVT, pulmonary embolus, death, stroke, complex regional pain syndrome, myocardial infarction, and need for additional procedures. Concept of substitution vs. replacement discussed.  No guarantees were given regarding results of surgery.      Dong Cevallos was given the opportunity to ask and have all questions answered today.  The patient voiced understanding of the risks, benefits, and alternative forms of treatment that were discussed and the patient consents to proceed with surgery.     Patient's blood clot history is negative.  Planned DVT prophylaxis for surgery:  Aspirin    Discharge Plan: POD 2-3 to home and home health, has Percocet at home from first surgery does not need new prescription.   Patient was seen by MIRELLA Em in the office today.    Date: 6/8/2018  MIRELLA White

## 2018-06-13 ENCOUNTER — APPOINTMENT (OUTPATIENT)
Dept: GENERAL RADIOLOGY | Facility: HOSPITAL | Age: 78
End: 2018-06-13

## 2018-06-13 ENCOUNTER — HOSPITAL ENCOUNTER (INPATIENT)
Facility: HOSPITAL | Age: 78
LOS: 1 days | Discharge: HOME HEALTH | End: 2018-06-14
Attending: ORTHOPAEDIC SURGERY | Admitting: ORTHOPAEDIC SURGERY

## 2018-06-13 ENCOUNTER — ANESTHESIA EVENT (OUTPATIENT)
Dept: PERIOP | Facility: HOSPITAL | Age: 78
End: 2018-06-13

## 2018-06-13 ENCOUNTER — ANESTHESIA (OUTPATIENT)
Dept: PERIOP | Facility: HOSPITAL | Age: 78
End: 2018-06-13

## 2018-06-13 DIAGNOSIS — D62 ACUTE BLOOD LOSS AS CAUSE OF POSTOPERATIVE ANEMIA: Primary | ICD-10-CM

## 2018-06-13 DIAGNOSIS — R26.2 DIFFICULTY WALKING: ICD-10-CM

## 2018-06-13 DIAGNOSIS — M16.12 ARTHRITIS OF LEFT HIP: ICD-10-CM

## 2018-06-13 PROCEDURE — 25010000002 ROPIVACAINE PER 1 MG: Performed by: ORTHOPAEDIC SURGERY

## 2018-06-13 PROCEDURE — C1776 JOINT DEVICE (IMPLANTABLE): HCPCS | Performed by: ORTHOPAEDIC SURGERY

## 2018-06-13 PROCEDURE — 25010000002 PROPOFOL 10 MG/ML EMULSION: Performed by: ANESTHESIOLOGY

## 2018-06-13 PROCEDURE — 25010000002 PROMETHAZINE PER 50 MG: Performed by: ANESTHESIOLOGY

## 2018-06-13 PROCEDURE — 25010000002 MIDAZOLAM PER 1 MG: Performed by: ANESTHESIOLOGY

## 2018-06-13 PROCEDURE — 20985 CPTR-ASST DIR MS PX: CPT | Performed by: ORTHOPAEDIC SURGERY

## 2018-06-13 PROCEDURE — 25010000003 CEFAZOLIN IN DEXTROSE 2-4 GM/100ML-% SOLUTION: Performed by: ORTHOPAEDIC SURGERY

## 2018-06-13 PROCEDURE — 27130 TOTAL HIP ARTHROPLASTY: CPT | Performed by: ORTHOPAEDIC SURGERY

## 2018-06-13 PROCEDURE — 97161 PT EVAL LOW COMPLEX 20 MIN: CPT

## 2018-06-13 PROCEDURE — 25010000002 EPINEPHRINE PER 0.1 MG: Performed by: ORTHOPAEDIC SURGERY

## 2018-06-13 PROCEDURE — 25010000002 DEXAMETHASONE PER 1 MG: Performed by: ANESTHESIOLOGY

## 2018-06-13 PROCEDURE — 25010000002 ONDANSETRON PER 1 MG: Performed by: NURSE ANESTHETIST, CERTIFIED REGISTERED

## 2018-06-13 PROCEDURE — 25010000002 KETOROLAC TROMETHAMINE PER 15 MG: Performed by: ORTHOPAEDIC SURGERY

## 2018-06-13 PROCEDURE — 25010000002 HYDROMORPHONE PER 4 MG: Performed by: NURSE ANESTHETIST, CERTIFIED REGISTERED

## 2018-06-13 PROCEDURE — 25010000002 PHENYLEPHRINE PER 1 ML: Performed by: ANESTHESIOLOGY

## 2018-06-13 PROCEDURE — 97110 THERAPEUTIC EXERCISES: CPT

## 2018-06-13 PROCEDURE — 73501 X-RAY EXAM HIP UNI 1 VIEW: CPT

## 2018-06-13 PROCEDURE — 0SRB03Z REPLACEMENT OF LEFT HIP JOINT WITH CERAMIC SYNTHETIC SUBSTITUTE, OPEN APPROACH: ICD-10-PCS | Performed by: ORTHOPAEDIC SURGERY

## 2018-06-13 PROCEDURE — S0260 H&P FOR SURGERY: HCPCS | Performed by: ORTHOPAEDIC SURGERY

## 2018-06-13 PROCEDURE — 8E0YXBZ COMPUTER ASSISTED PROCEDURE OF LOWER EXTREMITY: ICD-10-PCS | Performed by: ORTHOPAEDIC SURGERY

## 2018-06-13 PROCEDURE — 25010000002 FENTANYL CITRATE (PF) 100 MCG/2ML SOLUTION: Performed by: ANESTHESIOLOGY

## 2018-06-13 DEVICE — BIOLOX DELTA CERAMIC FEMORAL HEAD +1.5 36MM DIA 12/14 TAPER
Type: IMPLANTABLE DEVICE | Site: HIP | Status: FUNCTIONAL
Brand: BIOLOX DELTA

## 2018-06-13 DEVICE — TOTL HIP COA DEPUY 9641334: Type: IMPLANTABLE DEVICE | Site: HIP | Status: FUNCTIONAL

## 2018-06-13 DEVICE — PINNACLE GRIPTION ACETABULAR SHELL SECTOR 52MM OD
Type: IMPLANTABLE DEVICE | Site: HIP | Status: FUNCTIONAL
Brand: PINNACLE GRIPTION

## 2018-06-13 DEVICE — PINNACLE HIP SOLUTIONS ALTRX POLYETHYLENE ACETABULAR LINER +4 10 DEGREE 36MM ID 52MM OD
Type: IMPLANTABLE DEVICE | Site: HIP | Status: FUNCTIONAL
Brand: PINNACLE ALTRX

## 2018-06-13 DEVICE — TOTL HIP GRIPTION CUP DEPUY UPCHRG: Type: IMPLANTABLE DEVICE | Site: HIP | Status: FUNCTIONAL

## 2018-06-13 DEVICE — TRI-LOCK BPS FEMORAL STEM 12/14 TAPER TRI-LOCK BPS W/GRIPTION SIZE 6 HI 107MM
Type: IMPLANTABLE DEVICE | Site: HIP | Status: FUNCTIONAL
Brand: TRI-LOCK GRIPTION

## 2018-06-13 RX ORDER — HYDROMORPHONE HCL 110MG/55ML
PATIENT CONTROLLED ANALGESIA SYRINGE INTRAVENOUS AS NEEDED
Status: DISCONTINUED | OUTPATIENT
Start: 2018-06-13 | End: 2018-06-13 | Stop reason: SURG

## 2018-06-13 RX ORDER — ONDANSETRON 4 MG/1
4 TABLET, FILM COATED ORAL EVERY 6 HOURS PRN
Status: DISCONTINUED | OUTPATIENT
Start: 2018-06-13 | End: 2018-06-14 | Stop reason: HOSPADM

## 2018-06-13 RX ORDER — LABETALOL HYDROCHLORIDE 5 MG/ML
5 INJECTION, SOLUTION INTRAVENOUS
Status: DISCONTINUED | OUTPATIENT
Start: 2018-06-13 | End: 2018-06-13 | Stop reason: HOSPADM

## 2018-06-13 RX ORDER — ACETAMINOPHEN 325 MG/1
325 TABLET ORAL EVERY 4 HOURS PRN
Status: DISCONTINUED | OUTPATIENT
Start: 2018-06-13 | End: 2018-06-14 | Stop reason: HOSPADM

## 2018-06-13 RX ORDER — FENTANYL CITRATE 50 UG/ML
50 INJECTION, SOLUTION INTRAMUSCULAR; INTRAVENOUS
Status: DISCONTINUED | OUTPATIENT
Start: 2018-06-13 | End: 2018-06-13 | Stop reason: HOSPADM

## 2018-06-13 RX ORDER — LIDOCAINE HYDROCHLORIDE 10 MG/ML
0.5 INJECTION, SOLUTION EPIDURAL; INFILTRATION; INTRACAUDAL; PERINEURAL ONCE AS NEEDED
Status: DISCONTINUED | OUTPATIENT
Start: 2018-06-13 | End: 2018-06-13 | Stop reason: HOSPADM

## 2018-06-13 RX ORDER — FLUMAZENIL 0.1 MG/ML
0.2 INJECTION INTRAVENOUS AS NEEDED
Status: DISCONTINUED | OUTPATIENT
Start: 2018-06-13 | End: 2018-06-13 | Stop reason: HOSPADM

## 2018-06-13 RX ORDER — SODIUM CHLORIDE, SODIUM LACTATE, POTASSIUM CHLORIDE, CALCIUM CHLORIDE 600; 310; 30; 20 MG/100ML; MG/100ML; MG/100ML; MG/100ML
9 INJECTION, SOLUTION INTRAVENOUS CONTINUOUS
Status: DISCONTINUED | OUTPATIENT
Start: 2018-06-13 | End: 2018-06-13

## 2018-06-13 RX ORDER — BISACODYL 5 MG/1
10 TABLET, DELAYED RELEASE ORAL DAILY PRN
Status: DISCONTINUED | OUTPATIENT
Start: 2018-06-13 | End: 2018-06-14 | Stop reason: HOSPADM

## 2018-06-13 RX ORDER — MAGNESIUM HYDROXIDE 1200 MG/15ML
LIQUID ORAL AS NEEDED
Status: DISCONTINUED | OUTPATIENT
Start: 2018-06-13 | End: 2018-06-13 | Stop reason: HOSPADM

## 2018-06-13 RX ORDER — ONDANSETRON 4 MG/1
4 TABLET, ORALLY DISINTEGRATING ORAL EVERY 6 HOURS PRN
Status: DISCONTINUED | OUTPATIENT
Start: 2018-06-13 | End: 2018-06-14 | Stop reason: HOSPADM

## 2018-06-13 RX ORDER — DIPHENHYDRAMINE HYDROCHLORIDE 50 MG/ML
25 INJECTION INTRAMUSCULAR; INTRAVENOUS EVERY 6 HOURS PRN
Status: DISCONTINUED | OUTPATIENT
Start: 2018-06-13 | End: 2018-06-14 | Stop reason: HOSPADM

## 2018-06-13 RX ORDER — ONDANSETRON 2 MG/ML
4 INJECTION INTRAMUSCULAR; INTRAVENOUS EVERY 6 HOURS PRN
Status: DISCONTINUED | OUTPATIENT
Start: 2018-06-13 | End: 2018-06-14 | Stop reason: HOSPADM

## 2018-06-13 RX ORDER — CEFAZOLIN SODIUM 2 G/100ML
2 INJECTION, SOLUTION INTRAVENOUS EVERY 8 HOURS
Status: COMPLETED | OUTPATIENT
Start: 2018-06-13 | End: 2018-06-14

## 2018-06-13 RX ORDER — DOCUSATE SODIUM 100 MG/1
100 CAPSULE, LIQUID FILLED ORAL 2 TIMES DAILY
Status: DISCONTINUED | OUTPATIENT
Start: 2018-06-13 | End: 2018-06-14 | Stop reason: HOSPADM

## 2018-06-13 RX ORDER — HYDROMORPHONE HYDROCHLORIDE 1 MG/ML
0.5 INJECTION, SOLUTION INTRAMUSCULAR; INTRAVENOUS; SUBCUTANEOUS
Status: DISCONTINUED | OUTPATIENT
Start: 2018-06-13 | End: 2018-06-14 | Stop reason: HOSPADM

## 2018-06-13 RX ORDER — MIDAZOLAM HYDROCHLORIDE 1 MG/ML
2 INJECTION INTRAMUSCULAR; INTRAVENOUS
Status: DISCONTINUED | OUTPATIENT
Start: 2018-06-13 | End: 2018-06-13 | Stop reason: HOSPADM

## 2018-06-13 RX ORDER — BISACODYL 10 MG
10 SUPPOSITORY, RECTAL RECTAL DAILY PRN
Status: DISCONTINUED | OUTPATIENT
Start: 2018-06-13 | End: 2018-06-14 | Stop reason: HOSPADM

## 2018-06-13 RX ORDER — OXYCODONE AND ACETAMINOPHEN 7.5; 325 MG/1; MG/1
1 TABLET ORAL ONCE AS NEEDED
Status: DISCONTINUED | OUTPATIENT
Start: 2018-06-13 | End: 2018-06-13 | Stop reason: HOSPADM

## 2018-06-13 RX ORDER — DIPHENHYDRAMINE HCL 25 MG
25 CAPSULE ORAL EVERY 6 HOURS PRN
Status: DISCONTINUED | OUTPATIENT
Start: 2018-06-13 | End: 2018-06-14 | Stop reason: HOSPADM

## 2018-06-13 RX ORDER — PROMETHAZINE HYDROCHLORIDE 12.5 MG/1
12.5 TABLET ORAL EVERY 6 HOURS PRN
Status: DISCONTINUED | OUTPATIENT
Start: 2018-06-13 | End: 2018-06-14 | Stop reason: HOSPADM

## 2018-06-13 RX ORDER — PROMETHAZINE HYDROCHLORIDE 25 MG/1
12.5 TABLET ORAL ONCE AS NEEDED
Status: DISCONTINUED | OUTPATIENT
Start: 2018-06-13 | End: 2018-06-13 | Stop reason: HOSPADM

## 2018-06-13 RX ORDER — PROPOFOL 10 MG/ML
VIAL (ML) INTRAVENOUS AS NEEDED
Status: DISCONTINUED | OUTPATIENT
Start: 2018-06-13 | End: 2018-06-13 | Stop reason: SURG

## 2018-06-13 RX ORDER — HYDRALAZINE HYDROCHLORIDE 20 MG/ML
5 INJECTION INTRAMUSCULAR; INTRAVENOUS
Status: DISCONTINUED | OUTPATIENT
Start: 2018-06-13 | End: 2018-06-13 | Stop reason: HOSPADM

## 2018-06-13 RX ORDER — CEFAZOLIN SODIUM 2 G/100ML
2 INJECTION, SOLUTION INTRAVENOUS ONCE
Status: COMPLETED | OUTPATIENT
Start: 2018-06-13 | End: 2018-06-13

## 2018-06-13 RX ORDER — PROMETHAZINE HYDROCHLORIDE 25 MG/ML
12.5 INJECTION, SOLUTION INTRAMUSCULAR; INTRAVENOUS ONCE AS NEEDED
Status: COMPLETED | OUTPATIENT
Start: 2018-06-13 | End: 2018-06-13

## 2018-06-13 RX ORDER — FENTANYL CITRATE 50 UG/ML
INJECTION, SOLUTION INTRAMUSCULAR; INTRAVENOUS AS NEEDED
Status: DISCONTINUED | OUTPATIENT
Start: 2018-06-13 | End: 2018-06-13 | Stop reason: SURG

## 2018-06-13 RX ORDER — FAMOTIDINE 10 MG/ML
20 INJECTION, SOLUTION INTRAVENOUS ONCE
Status: COMPLETED | OUTPATIENT
Start: 2018-06-13 | End: 2018-06-13

## 2018-06-13 RX ORDER — SODIUM CHLORIDE, SODIUM LACTATE, POTASSIUM CHLORIDE, CALCIUM CHLORIDE 600; 310; 30; 20 MG/100ML; MG/100ML; MG/100ML; MG/100ML
100 INJECTION, SOLUTION INTRAVENOUS CONTINUOUS
Status: DISCONTINUED | OUTPATIENT
Start: 2018-06-13 | End: 2018-06-14 | Stop reason: HOSPADM

## 2018-06-13 RX ORDER — OXYCODONE AND ACETAMINOPHEN 7.5; 325 MG/1; MG/1
1 TABLET ORAL
Status: DISCONTINUED | OUTPATIENT
Start: 2018-06-13 | End: 2018-06-14 | Stop reason: HOSPADM

## 2018-06-13 RX ORDER — SODIUM CHLORIDE 0.9 % (FLUSH) 0.9 %
1-10 SYRINGE (ML) INJECTION AS NEEDED
Status: DISCONTINUED | OUTPATIENT
Start: 2018-06-13 | End: 2018-06-13 | Stop reason: HOSPADM

## 2018-06-13 RX ORDER — PROMETHAZINE HYDROCHLORIDE 25 MG/1
25 SUPPOSITORY RECTAL ONCE AS NEEDED
Status: COMPLETED | OUTPATIENT
Start: 2018-06-13 | End: 2018-06-13

## 2018-06-13 RX ORDER — EPHEDRINE SULFATE 50 MG/ML
5 INJECTION, SOLUTION INTRAVENOUS ONCE AS NEEDED
Status: DISCONTINUED | OUTPATIENT
Start: 2018-06-13 | End: 2018-06-13 | Stop reason: HOSPADM

## 2018-06-13 RX ORDER — ASPIRIN 325 MG
325 TABLET, DELAYED RELEASE (ENTERIC COATED) ORAL EVERY 12 HOURS SCHEDULED
Status: DISCONTINUED | OUTPATIENT
Start: 2018-06-14 | End: 2018-06-14 | Stop reason: HOSPADM

## 2018-06-13 RX ORDER — ONDANSETRON 2 MG/ML
INJECTION INTRAMUSCULAR; INTRAVENOUS AS NEEDED
Status: DISCONTINUED | OUTPATIENT
Start: 2018-06-13 | End: 2018-06-13 | Stop reason: SURG

## 2018-06-13 RX ORDER — OXYCODONE AND ACETAMINOPHEN 7.5; 325 MG/1; MG/1
2 TABLET ORAL
Status: DISCONTINUED | OUTPATIENT
Start: 2018-06-13 | End: 2018-06-14 | Stop reason: HOSPADM

## 2018-06-13 RX ORDER — ONDANSETRON 2 MG/ML
4 INJECTION INTRAMUSCULAR; INTRAVENOUS ONCE AS NEEDED
Status: DISCONTINUED | OUTPATIENT
Start: 2018-06-13 | End: 2018-06-13 | Stop reason: HOSPADM

## 2018-06-13 RX ORDER — MIDAZOLAM HYDROCHLORIDE 1 MG/ML
1 INJECTION INTRAMUSCULAR; INTRAVENOUS
Status: DISCONTINUED | OUTPATIENT
Start: 2018-06-13 | End: 2018-06-13 | Stop reason: HOSPADM

## 2018-06-13 RX ORDER — PROMETHAZINE HYDROCHLORIDE 25 MG/1
25 TABLET ORAL ONCE AS NEEDED
Status: COMPLETED | OUTPATIENT
Start: 2018-06-13 | End: 2018-06-13

## 2018-06-13 RX ORDER — LIDOCAINE HYDROCHLORIDE 20 MG/ML
INJECTION, SOLUTION INFILTRATION; PERINEURAL AS NEEDED
Status: DISCONTINUED | OUTPATIENT
Start: 2018-06-13 | End: 2018-06-13 | Stop reason: SURG

## 2018-06-13 RX ORDER — NALOXONE HCL 0.4 MG/ML
0.2 VIAL (ML) INJECTION AS NEEDED
Status: DISCONTINUED | OUTPATIENT
Start: 2018-06-13 | End: 2018-06-13 | Stop reason: HOSPADM

## 2018-06-13 RX ORDER — DEXAMETHASONE SODIUM PHOSPHATE 10 MG/ML
INJECTION INTRAMUSCULAR; INTRAVENOUS AS NEEDED
Status: DISCONTINUED | OUTPATIENT
Start: 2018-06-13 | End: 2018-06-13 | Stop reason: SURG

## 2018-06-13 RX ORDER — NALOXONE HCL 0.4 MG/ML
0.1 VIAL (ML) INJECTION
Status: DISCONTINUED | OUTPATIENT
Start: 2018-06-13 | End: 2018-06-14 | Stop reason: HOSPADM

## 2018-06-13 RX ORDER — HYDROCODONE BITARTRATE AND ACETAMINOPHEN 7.5; 325 MG/1; MG/1
1 TABLET ORAL ONCE AS NEEDED
Status: DISCONTINUED | OUTPATIENT
Start: 2018-06-13 | End: 2018-06-13 | Stop reason: HOSPADM

## 2018-06-13 RX ORDER — FAMOTIDINE 40 MG/1
40 TABLET, FILM COATED ORAL DAILY
Status: DISCONTINUED | OUTPATIENT
Start: 2018-06-13 | End: 2018-06-14 | Stop reason: HOSPADM

## 2018-06-13 RX ORDER — ROCURONIUM BROMIDE 10 MG/ML
INJECTION, SOLUTION INTRAVENOUS AS NEEDED
Status: DISCONTINUED | OUTPATIENT
Start: 2018-06-13 | End: 2018-06-13 | Stop reason: SURG

## 2018-06-13 RX ORDER — HYDROMORPHONE HYDROCHLORIDE 1 MG/ML
0.5 INJECTION, SOLUTION INTRAMUSCULAR; INTRAVENOUS; SUBCUTANEOUS
Status: DISCONTINUED | OUTPATIENT
Start: 2018-06-13 | End: 2018-06-13 | Stop reason: HOSPADM

## 2018-06-13 RX ORDER — DIPHENHYDRAMINE HYDROCHLORIDE 50 MG/ML
12.5 INJECTION INTRAMUSCULAR; INTRAVENOUS
Status: DISCONTINUED | OUTPATIENT
Start: 2018-06-13 | End: 2018-06-13 | Stop reason: HOSPADM

## 2018-06-13 RX ORDER — EPHEDRINE SULFATE 50 MG/ML
INJECTION, SOLUTION INTRAVENOUS AS NEEDED
Status: DISCONTINUED | OUTPATIENT
Start: 2018-06-13 | End: 2018-06-13 | Stop reason: SURG

## 2018-06-13 RX ADMIN — ROCURONIUM BROMIDE 50 MG: 10 INJECTION INTRAVENOUS at 09:12

## 2018-06-13 RX ADMIN — FENTANYL CITRATE 50 MCG: 50 INJECTION INTRAMUSCULAR; INTRAVENOUS at 09:07

## 2018-06-13 RX ADMIN — PHENYLEPHRINE HYDROCHLORIDE 200 MCG: 10 INJECTION INTRAVENOUS at 09:17

## 2018-06-13 RX ADMIN — SODIUM CHLORIDE, POTASSIUM CHLORIDE, SODIUM LACTATE AND CALCIUM CHLORIDE 9 ML/HR: 600; 310; 30; 20 INJECTION, SOLUTION INTRAVENOUS at 08:40

## 2018-06-13 RX ADMIN — CEFAZOLIN SODIUM 2 G: 2 INJECTION, SOLUTION INTRAVENOUS at 09:15

## 2018-06-13 RX ADMIN — ROCURONIUM BROMIDE 20 MG: 10 INJECTION INTRAVENOUS at 10:27

## 2018-06-13 RX ADMIN — SODIUM CHLORIDE, POTASSIUM CHLORIDE, SODIUM LACTATE AND CALCIUM CHLORIDE: 600; 310; 30; 20 INJECTION, SOLUTION INTRAVENOUS at 10:05

## 2018-06-13 RX ADMIN — PROMETHAZINE HYDROCHLORIDE 12.5 MG: 25 INJECTION INTRAMUSCULAR; INTRAVENOUS at 11:45

## 2018-06-13 RX ADMIN — MIDAZOLAM 2 MG: 1 INJECTION INTRAMUSCULAR; INTRAVENOUS at 08:40

## 2018-06-13 RX ADMIN — FAMOTIDINE 20 MG: 10 INJECTION, SOLUTION INTRAVENOUS at 08:41

## 2018-06-13 RX ADMIN — EPHEDRINE SULFATE 10 MG: 50 INJECTION INTRAMUSCULAR; INTRAVENOUS; SUBCUTANEOUS at 09:29

## 2018-06-13 RX ADMIN — PROPOFOL 50 MG: 10 INJECTION, EMULSION INTRAVENOUS at 09:12

## 2018-06-13 RX ADMIN — HYDROMORPHONE HYDROCHLORIDE 0.5 MG: 2 INJECTION INTRAMUSCULAR; INTRAVENOUS; SUBCUTANEOUS at 11:05

## 2018-06-13 RX ADMIN — PHENYLEPHRINE HYDROCHLORIDE 100 MCG: 10 INJECTION INTRAVENOUS at 09:45

## 2018-06-13 RX ADMIN — ONDANSETRON 4 MG: 2 INJECTION INTRAMUSCULAR; INTRAVENOUS at 10:48

## 2018-06-13 RX ADMIN — PROPOFOL 100 MG: 10 INJECTION, EMULSION INTRAVENOUS at 09:10

## 2018-06-13 RX ADMIN — TRANEXAMIC ACID 1000 MG: 100 INJECTION, SOLUTION INTRAVENOUS at 09:28

## 2018-06-13 RX ADMIN — LIDOCAINE HYDROCHLORIDE 80 MG: 20 INJECTION, SOLUTION INFILTRATION; PERINEURAL at 09:10

## 2018-06-13 RX ADMIN — FENTANYL CITRATE 50 MCG: 50 INJECTION, SOLUTION INTRAMUSCULAR; INTRAVENOUS at 11:33

## 2018-06-13 RX ADMIN — PHENYLEPHRINE HYDROCHLORIDE 200 MCG: 10 INJECTION INTRAVENOUS at 09:29

## 2018-06-13 RX ADMIN — DEXAMETHASONE SODIUM PHOSPHATE 8 MG: 10 INJECTION INTRAMUSCULAR; INTRAVENOUS at 09:28

## 2018-06-13 RX ADMIN — TRANEXAMIC ACID 1000 MG: 100 INJECTION, SOLUTION INTRAVENOUS at 10:35

## 2018-06-13 RX ADMIN — PHENYLEPHRINE HYDROCHLORIDE 100 MCG: 10 INJECTION INTRAVENOUS at 10:47

## 2018-06-13 RX ADMIN — PHENYLEPHRINE HYDROCHLORIDE 200 MCG: 10 INJECTION INTRAVENOUS at 10:17

## 2018-06-13 RX ADMIN — CEFAZOLIN SODIUM 2 G: 2 INJECTION, SOLUTION INTRAVENOUS at 21:02

## 2018-06-13 RX ADMIN — MUPIROCIN: 20 OINTMENT TOPICAL at 21:07

## 2018-06-13 RX ADMIN — CEFAZOLIN SODIUM 2 G: 2 INJECTION, SOLUTION INTRAVENOUS at 11:34

## 2018-06-13 RX ADMIN — SODIUM CHLORIDE, POTASSIUM CHLORIDE, SODIUM LACTATE AND CALCIUM CHLORIDE 100 ML/HR: 600; 310; 30; 20 INJECTION, SOLUTION INTRAVENOUS at 18:08

## 2018-06-13 RX ADMIN — SUGAMMADEX 300 MG: 100 INJECTION, SOLUTION INTRAVENOUS at 10:58

## 2018-06-13 NOTE — H&P
"History & Physical         Patient: Dong Cevallos  YOB: 1940  Medical Record Number: [id number]      Wt Readings from Last 3 Encounters:   06/08/18 74.4 kg (164 lb)   06/06/18 74.4 kg (164 lb)   05/18/18 78 kg (172 lb)          Ht Readings from Last 3 Encounters:   06/08/18 177.8 cm (70\")   06/06/18 177.8 cm (70\")   05/18/18 175.3 cm (69\")      Body mass index is 23.53 kg/m².  Facility age limit for growth percentiles is 20 years.     Surgeon:  Dr. George Gonzalez     Chief Complaints:       Chief Complaint   Patient presents with   • Left Hip - Pre-op Exam, Pain         Subjective:     History of Present Illness: 77 y.o. male presents with       Chief Complaint   Patient presents with   • Left Hip - Pre-op Exam, Pain   . Onset of symptoms was years ago and has been progressively worsening despite more conservative treatment measures.  Symptoms are associated with ability to move, exercise, and perform activities of daily living.  Symptoms are aggravated by weight bearing and ROM necessary for activities of daily living.   Symptoms improve with rest, ice and elevation only minimally.       Allergies:         Allergies   Allergen Reactions   • Asa [Aspirin] Other (See Comments)       Had bleeding in the past from rectal fissures, but is not severe and can tolerate aspirin for safety following surgery. Discussed with patient.          Medications:   Home Medications:       Current Outpatient Prescriptions on File Prior to Visit   Medication Sig   • Cholecalciferol (VITAMIN D3) 5000 units capsule capsule Take 5,000 Units by mouth Daily. 3 CAPS PER DAY LAST DOSE  6/6/16   • Cyanocobalamin (B-12) 1000 MCG sublingual tablet Place 1 tablet under the tongue Daily. LAST DOSE 6/6/18   • Multiple Vitamins-Minerals (MULTIVITAMIN ADULT PO) Take 1 tablet by mouth Daily. LAST DOSE 6/6/18   • Nutritional Supplements (COMPLETE PROTEIN/VITAMIN SHAKE PO) Take 8 oz by mouth Daily.   • Nutritional Supplements (ENSURE " PLUS PO) Take 8 oz by mouth Daily.   • Nutritional Supplements (NUTRITIONAL SHAKE PLUS PO) Take 8 oz by mouth 2 (Two) Times a Day.   • polyethylene glycol (MIRALAX) pack packet Take 17 g by mouth Daily. (Patient taking differently: Take 17 g by mouth As Needed.)   • vitamin B-12 (CYANOCOBALAMIN) 1000 MCG tablet Take 1,000 mcg by mouth Daily. 2 CAPS PER DAY-LAST DOSE 18          Current Facility-Administered Medications on File Prior to Visit   Medication   • [] mupirocin (BACTROBAN) 2 % nasal ointment      Current Medications:  Scheduled Meds:  Continuous Infusions:  No current facility-administered medications for this visit.   PRN Meds:.     I have reviewed the patient's medical history in detail and updated the computerized patient record.  Review and summarization of old records include:    Medical History        Past Medical History:   Diagnosis Date   • Bilateral hip pain     • History of anxiety     • History of prostate cancer     • History of seizures       NONE SINCE 2013   • Hypertension       IN PAST- SINCE WEIGHT LOSS OF 77 LBS   • Muscle weakness       LEGS   • Osteoarthritis     • PONV (postoperative nausea and vomiting)       IN , AFTER PROSTATECTOMY   • Stroke    • Unsteady gait              Surgical History         Past Surgical History:   Procedure Laterality Date   • APPENDECTOMY       • COLONOSCOPY        ALL OK   • INGUINAL HERNIA REPAIR Right 3/9/2018     Procedure: RIGHT INGUINAL HERNIA REPAIR WITH MESH;  Surgeon: Matt Zamora Jr., MD;  Location: [facility];  Service: General   • PROSTATECTOMY      • TOTAL HIP ARTHROPLASTY Right 2018     Procedure: RIGHT TOTAL HIP ARTHROPLASTY WITH CHRISTOPH NAVIGATION;  Surgeon: George Gonzalez MD;  Location: [facility];  Service: Orthopedics            Social History          Occupational History   • Not on file.      Social History Main Topics    • Smoking status: Never Smoker    • Smokeless tobacco: Never Used    •  "Alcohol use No    • Drug use: No    • Sexual activity: Defer     Social History          Social History Narrative   • No narrative on file               Family History   Problem Relation Age of Onset   • Malig Hyperthermia Neg Hx           ROS: 14 point review of systems was performed and was negative except for documented findings in HPI and today's encounter.      Allergies:         Allergies   Allergen Reactions   • Asa [Aspirin] Other (See Comments)       Had bleeding in the past from rectal fissures, but is not severe and can tolerate aspirin for safety following surgery. Discussed with patient.       Constitutional:  Denies fever, shaking or chills   Eyes:  Denies change in visual acuity   HENT:  Denies nasal congestion or sore throat   Respiratory:  Denies cough or shortness of breath   Cardiovascular:  Denies chest pain or severe LE edema   GI:  Denies abdominal pain, nausea, vomiting, bloody stools or diarrhea   Musculoskeletal:  Denies numbness tingling or loss of motor function except as outlined above in history of present illness.  : Denies painful urination or hematuria  Integument:  Denies rash, lesion or ulceration   Neurologic:  Denies headache or focal weakness  Endocrine:  Denies lymphadenopathy  Psych:  Denies confusion or change in mental status             Hem:  Denies active bleeding     Physical Exam: 77 y.o. male      Wt Readings from Last 3 Encounters:   06/08/18 74.4 kg (164 lb)   06/06/18 74.4 kg (164 lb)   05/18/18 78 kg (172 lb)          Ht Readings from Last 3 Encounters:   06/08/18 177.8 cm (70\")   06/06/18 177.8 cm (70\")   05/18/18 175.3 cm (69\")      Body mass index is 23.53 kg/m².  Facility age limit for growth percentiles is 20 years.      Vitals:     06/08/18 1005   Temp: 99 °F (37.2 °C)         Vital signs reviewed.   General Appearance:    Alert, cooperative, in no acute distress                  Eyes: conjunctiva clear  ENT: external ears and nose atraumatic  CV: no " peripheral edema  Resp: normal respiratory effort  Skin: no rashes or wounds; normal turgor  Psych: mood and affect appropriate  Lymph: no nodes appreciated  Neuro: gross sensation intact  Vascular:  Palpable peripheral pulse in noted extremity  Musculoskeletal Extremities: HIP Exam: antalgic and stiff-legged gait with assistive device left hip, Stinchfield postitive, stiffness and MEERA test positive 2+ pedal pulses and brisk capillary refill Pedal edema none              Diagnostic Tests:          Appointment on 06/06/2018   Component Date Value Ref Range Status   • Glucose 06/06/2018 91  65 - 99 mg/dL Final   • BUN 06/06/2018 22  8 - 23 mg/dL Final   • Creatinine 06/06/2018 0.93  0.76 - 1.27 mg/dL Final   • Sodium 06/06/2018 143  136 - 145 mmol/L Final   • Potassium 06/06/2018 4.1  3.5 - 5.2 mmol/L Final   • Chloride 06/06/2018 103  98 - 107 mmol/L Final   • CO2 06/06/2018 28.1  22.0 - 29.0 mmol/L Final   • Calcium 06/06/2018 9.9  8.6 - 10.5 mg/dL Final   • eGFR Non  Amer 06/06/2018 79  >60 mL/min/1.73 Final   • BUN/Creatinine Ratio 06/06/2018 23.7  7.0 - 25.0 Final   • Anion Gap 06/06/2018 11.9  mmol/L Final   • WBC 06/06/2018 6.92  4.50 - 10.70 10*3/mm3 Final   • RBC 06/06/2018 4.55* 4.60 - 6.00 10*6/mm3 Final   • Hemoglobin 06/06/2018 13.0* 13.7 - 17.6 g/dL Final   • Hematocrit 06/06/2018 43.4  40.4 - 52.2 % Final   • MCV 06/06/2018 95.4  79.8 - 96.2 fL Final   • MCH 06/06/2018 28.6  27.0 - 32.7 pg Final   • MCHC 06/06/2018 30.0* 32.6 - 36.4 g/dL Final   • RDW 06/06/2018 17.2* 11.5 - 14.5 % Final   • RDW-SD 06/06/2018 60.0* 37.0 - 54.0 fl Final   • MPV 06/06/2018 9.3  6.0 - 12.0 fL Final   • Platelets 06/06/2018 238  140 - 500 10*3/mm3 Final   • Color, UA 06/06/2018 Yellow  Yellow, Straw Final   • Appearance,  06/06/2018 Cloudy* Clear Final   • pH,  06/06/2018 7.5  5.0 - 8.0 Final   • Specific Gravity,  06/06/2018 1.014  1.005 - 1.030 Final   • Glucose,  06/06/2018 Negative  Negative Final   •  Ketones, UA 06/06/2018 Negative  Negative Final   • Bilirubin, UA 06/06/2018 Negative  Negative Final   • Blood, UA 06/06/2018 Negative  Negative Final   • Protein, UA 06/06/2018 Negative  Negative Final   • Leuk Esterase, UA 06/06/2018 Small (1+)* Negative Final   • Nitrite, UA 06/06/2018 Negative  Negative Final   • Urobilinogen, UA 06/06/2018 0.2 E.U./dL  0.2 - 1.0 E.U./dL Final   • RBC, UA 06/06/2018 0-2  None Seen, 0-2 /HPF Final   • WBC, UA 06/06/2018 6-12* None Seen, 0-2 /HPF Final   • Bacteria, UA 06/06/2018 None Seen  None Seen /HPF Final   • Squamous Epithelial Cells, UA 06/06/2018 0-2  None Seen, 0-2 /HPF Final   • Hyaline Casts, UA 06/06/2018 0-2  None Seen /LPF Final   • Methodology 06/06/2018 Automated Microscopy    Final   • Urine Culture 06/06/2018 >100,000 CFU/mL Mixed Avril Isolated    Final         Imaging was done today, images were personally viewed, viewed images and discussed with the patient:    Indication: pain related symptoms,  Views: 3V AP, LAT & 40 degree PA left hip(s)   Findings: severe end-stage arthritis (bone on bone, subchondral sclerosis/cysts, osteophytes), S/P right  Total Hip Replacement in good position and alignment  Comparison views: viewed last xray done in the office.      Assessment:      Patient Active Problem List   Diagnosis   • Hypertension   • Anxiety   • Osteoarthritis   • Seizure disorder   • Low back pain radiating to both legs   • Avascular necrosis of bones of both hips   • Arthritis of right hip   • Arthritis of left hip   • Degenerative disc disease, lumbar   • Chronic pain of both knees   • Pain in both knees   • Right inguinal hernia   • Status post total replacement of right hip         Plan:  Dr. George Gonzalez reviewed anatomy of a total joint arthroplasty in laymen's terms, as well as typical postoperative recovery and possibly 6-12 months for maximal recovery, and possible need for rehabilitation stay after hospitalization. We also discussed risks,  benefits, alternatives, and limitations of procedure with risks including but not limited to neurovascular damage, bleeding, infection, malalignment, chronic pian, failure of implants, osteolysis, loosening of implants, loss of motion, weakness, stiffness, instability, DVT, pulmonary embolus, death, stroke, complex regional pain syndrome, myocardial infarction, and need for additional procedures. Concept of substitution vs. replacement discussed.  No guarantees were given regarding results of surgery.       Dong Cevallos was given the opportunity to ask and have all questions answered today.  The patient voiced understanding of the risks, benefits, and alternative forms of treatment that were discussed and the patient consents to proceed with surgery.      Patient's blood clot history is negative.  Planned DVT prophylaxis for surgery:  Aspirin     Discharge Plan: POD 2-3 to home and home health, has Percocet at home from first surgery does not need new prescription.   Patient was seen by MIRELLA Em in the office today.     Date: 6/8/2018  MIRELLA White           No changes noted

## 2018-06-13 NOTE — ANESTHESIA PREPROCEDURE EVALUATION
Anesthesia Evaluation     Patient summary reviewed and Nursing notes reviewed   history of anesthetic complications:  NPO Solid Status: > 8 hours  NPO Liquid Status: > 2 hours           Airway   Mallampati: II  TM distance: >3 FB  Neck ROM: full  Dental - normal exam     Pulmonary - negative pulmonary ROS and normal exam   Cardiovascular - normal exam    ECG reviewed  Rhythm: regular  Rate: normal    (+) hypertension,       Neuro/Psych  (+) CVA, psychiatric history Anxiety and Depression,       ROS Comment: History of seizures NONE SINCE 9/2013   GI/Hepatic/Renal/Endo - negative ROS     Musculoskeletal     (+) back pain,   Abdominal    Substance History - negative use     OB/GYN negative ob/gyn ROS         Other   (+) arthritis                       Anesthesia Plan    ASA 3     general     intravenous induction   Anesthetic plan and risks discussed with patient.

## 2018-06-13 NOTE — ANESTHESIA PROCEDURE NOTES
Airway  Urgency: elective    Airway not difficult    General Information and Staff    Patient location during procedure: OR  Anesthesiologist: VASHTI STEVENSON    Indications and Patient Condition  Indications for airway management: airway protection    Preoxygenated: yes  Mask difficulty assessment: 1 - vent by mask    Final Airway Details  Final airway type: endotracheal airway      Successful airway: ETT  Cuffed: yes   Successful intubation technique: direct laryngoscopy  Endotracheal tube insertion site: oral  Blade: Ed  Blade size: #4  ETT size: 8.0 mm  Cormack-Lehane Classification: grade IIa - partial view of glottis  Placement verified by: chest auscultation and capnometry   Measured from: teeth  ETT to teeth (cm): 23  Number of attempts at approach: 1

## 2018-06-13 NOTE — THERAPY EVALUATION
Acute Care - Physical Therapy Initial Evaluation  [facility]     Patient Name: Dong Cevallos  : 1940  MRN: [id number]  Today's Date: 2018   Onset of Illness/Injury or Date of Surgery: 18  Date of Referral to PT: 18  Referring Physician: George Vargas      Admit Date: 2018    Visit Dx:     ICD-10-CM ICD-9-CM   1. Acute blood loss as cause of postoperative anemia D62 285.1   2. Arthritis of left hip M16.12 716.95   3. Difficulty walking R26.2 719.7     Patient Active Problem List   Diagnosis   • Hypertension   • Anxiety   • Osteoarthritis   • Seizure disorder   • Low back pain radiating to both legs   • Avascular necrosis of bones of both hips   • Arthritis of right hip   • Arthritis of left hip   • Degenerative disc disease, lumbar   • Chronic pain of both knees   • Pain in both knees   • Right inguinal hernia   • Status post total replacement of right hip     Past Medical History:   Diagnosis Date   • Bilateral hip pain    • History of anxiety    • History of prostate cancer    • History of seizures     NONE SINCE 2013   • Hypertension     IN PAST- SINCE WEIGHT LOSS OF 77 LBS   • Muscle weakness     LEGS   • Osteoarthritis    • PONV (postoperative nausea and vomiting)     IN , AFTER PROSTATECTOMY   • Stroke    • Unsteady gait      Past Surgical History:   Procedure Laterality Date   • APPENDECTOMY     • COLONOSCOPY      ALL OK   • INGUINAL HERNIA REPAIR Right 3/9/2018    Procedure: RIGHT INGUINAL HERNIA REPAIR WITH MESH;  Surgeon: Matt Zamora Jr., MD;  Location: [facility];  Service: General   • PROSTATECTOMY     • TOTAL HIP ARTHROPLASTY Right 2018    Procedure: RIGHT TOTAL HIP ARTHROPLASTY WITH CHRISTOPH NAVIGATION;  Surgeon: George Gonzalez MD;  Location: [facility];  Service: Orthopedics        PT ASSESSMENT (last 12 hours)      Physical Therapy Evaluation     Row Name 18 1543          PT Evaluation Time/Intention    Subjective  Information complains of;fatigue;pain  -MS     Document Type evaluation  -MS     Mode of Treatment physical therapy;individual therapy  -MS     Patient Effort good  -MS     Comment Pt. reports mild pain/soreness in his Left hip this PM. Otherwise, pt. agreeable to work with P.T.  -MS     Row Name 06/13/18 1544          General Information    Onset of Illness/Injury or Date of Surgery 06/13/18  -MS     Referring Physician George Vargas  -MS     Patient Observations agree to therapy;cooperative  -MS     Prior Level of Function independent:  -MS     Equipment Currently Used at Home none  -MS     Pertinent History of Current Functional Problem Pt. is s/p Left THR  -MS     Existing Precautions/Restrictions hip, posterior   Left L.E.  -MS     Equipment Ordered for Patient --   Pt. already owns a Rwx for home use.  -MS     Risks Reviewed patient:  -MS     Benefits Reviewed patient:  -MS     Barriers to Rehab none identified  -MS     Row Name 06/13/18 154          Cognitive Assessment/Intervention- PT/OT    Orientation Status (Cognition) oriented x 3  -MS     Follows Commands (Cognition) WNL  -MS     Personal Safety Interventions fall prevention program maintained;gait belt;nonskid shoes/slippers when out of bed;supervised activity  -MS     Row Name 06/13/18 1546          Mobility Assessment/Treatment    Extremity Weight-bearing Status left lower extremity  -MS     Left Lower Extremity (Weight-bearing Status) weight-bearing as tolerated (WBAT)  -MS     Row Name 06/13/18 1546          Bed Mobility Assessment/Treatment    Bed Mobility Assessment/Treatment supine-sit;sit-supine  -MS     Supine-Sit [place] (Bed Mobility) independent  -MS     Row Name 06/13/18 154          Transfer Assessment/Treatment    Transfer Assessment/Treatment sit-stand transfer;stand-sit transfer  -MS     Sit-Stand [place] (Transfers) contact guard  -MS     Stand-Sit [place] (Transfers) contact guard  -MS     Row Name 06/13/18 5652           Sit-Stand Transfer    Assistive Device (Sit-Stand Transfers) walker, front-wheeled  -MS     Row Name 06/13/18 1543          Stand-Sit Transfer    Assistive Device (Stand-Sit Transfers) walker, front-wheeled  -MS     Row Name 06/13/18 1543          Gait/Stairs Assessment/Training    [place] Level (Gait) contact guard  -MS     Assistive Device (Gait) walker, front-wheeled  -MS     Distance in Feet (Gait) 30 feet  -MS     Pattern (Gait) step-to  -MS     Deviations/Abnormal Patterns (Gait) antalgic;barb decreased  -MS     Row Name 06/13/18 1543          General ROM    GENERAL ROM COMMENTS BUE/RLE (WFL's)  -MS     Row Name 06/13/18 1543          General Assessment (Manual Muscle Testing)    Comment, General Manual Muscle Testing (MMT) Assessment BUE/RLE (4-/5)  -MS     Row Name 06/13/18 1543          Therapeutic Exercise    Comment (Therapeutic Exercise) Left THR protocol x 10 reps completed  -MS     Row Name 06/13/18 1543          Pain Assessment    Additional Documentation Pain Scale: Numbers Pre/Post-Treatment (Group)  -MS     [facility] Name 06/13/18 1543          Pain Scale: Numbers Pre/Post-Treatment    Pain Scale: Numbers, Pretreatment 3/10  -MS     Pain Scale: Numbers, Post-Treatment 3/10  -MS     Pain Location - Side Left  -MS     Pain Location hip  -MS     Pain Intervention(s) Medication (See MAR);Cold applied  -MS     Row Name             Wound 06/13/18 0959 Left hip incision    Wound - Properties Group Date first assessed: 06/13/18  -HH Time first assessed: 0959  -HH Side: Left  -HH Location: hip  -HH Type: incision  -HH    Row Name 06/13/18 1543          Physical Therapy Clinical Impression    Date of Referral to PT 06/13/18  -MS     Criteria for Skilled Interventions Met (PT Clinical Impression) treatment indicated  -MS     Rehab Potential (PT Clinical Summary) good, to achieve stated therapy goals  -MS     Row Name 06/13/18 1543          Physical Therapy Goals    Transfer Goal Selection (PT)  transfer, PT goal 1  -MS     Gait Training Goal Selection (PT) gait training, PT goal 1  -MS     Row Name 06/13/18 1543          Transfer Goal 1 (PT)    Activity/Assistive Device (Transfer Goal 1, PT) transfers, all;walker, rolling  -MS     [place] Level/Cues Needed (Transfer Goal 1, PT) independent  -MS     Time Frame (Transfer Goal 1, PT) long term goal (LTG);2 - 3 days  -MS     Row Name 06/13/18 1543          Gait Training Goal 1 (PT)    Activity/Assistive Device (Gait Training Goal 1, PT) gait (walking locomotion);walker, rolling  -MS     [place] Level (Gait Training Goal 1, PT) independent  -MS     Distance (Gait Goal 1, PT) 120 feet  -MS     Time Frame (Gait Training Goal 1, PT) long term goal (LTG);2 - 3 days  -MS     Row Name 06/13/18 1543          Positioning and Restraints    Pre-Treatment Position in bed  -MS     Post Treatment Position chair  -MS     In Chair notified nsg;reclined;sitting;call light within reach;encouraged to call for assist;exit alarm on   All lines intact. Ice pack to Left hip.  -MS       User Key  (r) = Recorded By, (t) = Taken By, (c) = Cosigned By    Initials Name Provider Type    MIRIAN Parrish, VITO Registered Nurse    MS Kev Ulloa, PT Physical Therapist          Physical Therapy Education     Title: PT OT SLP Therapies (Done)     Topic: Physical Therapy (Done)     Point: Mobility training (Done)    Learning Progress Summary     Learner Status Readiness Method Response Comment Documented by    Patient Done Acceptance LUANA DEMPSEY NR  MS 06/13/18 4188          Point: Home exercise program (Done)    Learning Progress Summary     Learner Status Readiness Method Response Comment Documented by    Patient Done Acceptance LUANA DEMPSEY NR  MS 06/13/18 1548          Point: Body mechanics (Done)    Learning Progress Summary     Learner Status Readiness Method Response Comment Documented by    Patient Done Acceptance LUANA DEMPSEY NR  MS 06/13/18 1548          Point: Precautions (Done)     Learning Progress Summary     Learner Status Readiness Method Response Comment Documented by    Patient Done Acceptance LUANA DEMPSEY NR  MS 06/13/18 8708                      User Key     Initials Effective Dates Name Provider Type Discipline    MS 04/03/18 -  Kev Ulloa, PT Physical Therapist PT                PT Recommendation and Plan  Anticipated Discharge Disposition (PT): home with assist, home with home health  Planned Therapy Interventions (PT Eval): balance training, bed mobility training, gait training, home exercise program, patient/family education, postural re-education, strengthening, transfer training  Therapy Frequency (PT Clinical Impression): 2 times/day  Outcome Summary/Treatment Plan (PT)  Anticipated Discharge Disposition (PT): home with assist, home with home health  Plan of Care Reviewed With: patient  Outcome Summary: Pt. will benefit from skilled inpt. P.T. to address his functional deficits and to assist pt. in regaining his independence with functional mobility.          Outcome Measures     Row Name 06/13/18 1500             How much help from another person do you currently need...    Turning from your back to your side while in flat bed without using bedrails? 4  -MS      Moving from lying on back to sitting on the side of a flat bed without bedrails? 4  -MS      Moving to and from a bed to a chair (including a wheelchair)? 3  -MS      Standing up from a chair using your arms (e.g., wheelchair, bedside chair)? 3  -MS      Climbing 3-5 steps with a railing? 3  -MS      To walk in hospital room? 3  -MS      AM-PAC 6 Clicks Score 20  -MS         Functional Assessment    Outcome Measure Options AM-PAC 6 Clicks Basic Mobility (PT)  -MS        User Key  (r) = Recorded By, (t) = Taken By, (c) = Cosigned By    Initials Name Provider Type    MS Kev Ulloa, PT Physical Therapist           Time Calculation:         PT Charges     Row Name 06/13/18 5430             Time Calculation    Start  Time 1503  -MS      Stop Time 1524  -MS      Time Calculation (min) 21 min  -MS      PT Received On 06/13/18  -MS      PT - Next Appointment 06/14/18  -MS      PT Goal Re-Cert Due Date 06/15/18  -MS        User Key  (r) = Recorded By, (t) = Taken By, (c) = Cosigned By    Initials Name Provider Type    MS Kev Ulloa, PT Physical Therapist        Therapy Suggested Charges     Code   Minutes Charges    None           Therapy Charges for Today     Code Description Service Date Service Provider Modifiers Qty    08203760206 HC PT EVAL LOW COMPLEXITY 1 6/13/2018 Kev Ulloa, PT GP 1    01544918856 HC PT THER PROC EA 15 MIN 6/13/2018 Kev Ulloa, PT GP 1          PT G-Codes  Outcome Measure Options: AM-PAC 6 Clicks Basic Mobility (PT)      Kev Ulloa, PT  6/13/2018

## 2018-06-13 NOTE — PLAN OF CARE
Problem: Patient Care Overview  Goal: Plan of Care Review   06/13/18 1548   Coping/Psychosocial   Plan of Care Reviewed With patient   OTHER   Outcome Summary Pt. will benefit from skilled inpt. P.T. to address his functional deficits and to assist pt. in regaining his independence with functional mobility.

## 2018-06-13 NOTE — OP NOTE
Operative Note  Name: Dong Cevallos  YOB: 1940  MRN: [id number]  BMI: Body mass index is 25.99 kg/m².    DATE OF SURGERY: 6/13/2018    PREOPERATIVE DIAGNOSIS:  left hip end-stage osteoarthritis    POSTOPERATIVE DIAGNOSIS: Same.    PROCEDURE PERFORMED:  left Total hip replacement with Jake navigation    SURGEON:  George Gonzalez M.D.    ASSISTANT:  TRACY Lora    IMPLANTS:   Implant Name Type Inv. Item Serial No.  Lot No. LRB No. Used   CUP ACET PINN SECTOR W GRIPTN 52MM - [id number] Implant CUP ACET PINN SECTOR W GRIPTN 52MM  DEPUY [id number] Left 1   LINER ACET ALTRX FACECHG 10D 52X36 PLS4 - [id number] Implant LINER ACET ALTRX FACECHG 10D 52X36 PLS4  DEPUY [id number] Left 1   STEM FEM TRILOCK BPS WGRIPTN OFFST HI 6 - [id number] Implant STEM FEM TRILOCK BPS WGRIPTN OFFST HI 6  DEPUY [id number] Left 1   HD FEM BIOLOXDELTA/ART CERAM 36MM PLS1.5 - [id number] Implant HD FEM BIOLOXDELTA/ART CERAM 36MM PLS1.5   DEPUY   Left 1        Anesthesia: Gen. and local  IV fluid: 1500 crystalloid  Blood loss: 400 cc  Specimen: None  Drain: None  Complications: None   22 Modifier:  none    Description:    Dong Cevallos is a 77 y.o.-year-old male with end-stage osteoarthritis of the hip, who presents today for surgery having failed nonoperative therapy.  This patient was brought into the operating room and placed in the supine position on the operative table. Surgical time out was performed.  Gen. anesthetic was induced, the patient was given IV antibiotics, and was placed in a lateral decubitus position with padding and an axillary roll. The operative lower extremity was prepped with Betadine scrub and paint, and draped with sterile drapes including an [place]. 2 stab wounds were made above the iliac crest anterior superior iliac spine. Two 5 mm half pins were placed in the iliac crest and navigation protocol was undertaken.    A 3-4 inch incision was based in the skin posteriorly. Dissection was  carried down to the fascia which was opened. Retractors were placed. The sciatic nerve was identified and avoided. The short external rotators were identified and the capsulotomy was performed. The hip was dislocated and femoral neck cut was made. It was made in line with the template of just less than a fingerbreadth. Acetabular soft tissues were removed and retractors were placed. Acetabular navigation was achieved. Was then reamed sequentially up to fitted size. A trial was placed. It was reamed in approximately 40° of abduction and 20° of anteversion. Bone grafts were applied and appropriate size cup was selected and impacted to affix the cup. The final position was 42° of abduction and 22° of anteversion.    Femoral canal was opened with a . The femur was sequentially broached up to appropriate size using the Tri-Lock system and good fit and fill.  Offset was selected using the preoperative template for starting point and tried to correct fit. Leg length was restored or slightly lengthened according the navigation which was in line with the preoperative template. Trial showed equivalent leg lengths, no undue tightness, no undue shock, and good approximation of soft tissues. The hip was stable anteriorly and 90° of flexion you could internally rotated to 80-85° before it started to come out.    Bony surfaces were irrigated and the Tri-Lock was impacted with gentle blows of the mallet. Once again trialed and found to do best with the chosen liner. It reproduced stability and leg length and offset of the trials. The ceramic femoral head was then applied and taken through final range of motion checked all of which were excellent. The wound was dry at the time of surgical conclusion. Ends examined casted and orthopedic mix were infiltrated. The final irrigation was achieved with all 3 L of irrigation fluid. The short external rotators were repaired in an interrupted fashion with a good closure of the  capsule and short external rotators. The fascia was closed in an interrupted fashion in a watertight manner. Multilayered closure was placed in the subcutaneous fat which measured 3-4 inches. This required additional time and skill the skin was closed with a v lock suture. Surgical glue was applied and sterile dressings were applied as well. Findings needle and instrument counts reported to be correct. No complications noted but x-rays ordered for the recovery area.     Surgical assistant performed retraction, suction, hemostasis, and specific limb positioning and manipulation required for accuracy of joint placement, and assistance in wound closure and dressing application.     Dr. George Gonzalez dictating an operative note.    6/13/2018

## 2018-06-13 NOTE — ANESTHESIA POSTPROCEDURE EVALUATION
"Patient: Dong Cevallos    Procedure Summary     Date:  06/13/18 Room / Location:  [facility] OR [address] [place] [address] MAIN OR    Anesthesia Start:  0903 Anesthesia Stop:  1120    Procedure:  LEFT TOTAL HIP ARTHROPLASTY CHRISTOPH NAVIGATION (Left Hip) Diagnosis:       Arthritis of left hip      (Arthritis of left hip [M16.12])    Surgeon:  George Gonzalez MD Provider:  Samy Puckett MD    Anesthesia Type:  general ASA Status:  3          Anesthesia Type: general  Last vitals  BP   167/89 (06/13/18 1215)   Temp   36.6 °C (97.8 °F) (06/13/18 1208)   Pulse   74 (06/13/18 1215)   Resp   14 (06/13/18 1215)     SpO2   98 % (06/13/18 1215)     Post Anesthesia Care and Evaluation    Patient location during evaluation: bedside  Patient participation: complete - patient participated  Level of consciousness: sleepy but conscious  Pain score: 0  Pain management: adequate  Airway patency: patent  Anesthetic complications: No anesthetic complications    Cardiovascular status: acceptable  Respiratory status: acceptable  Hydration status: acceptable    Comments: /89 (BP Location: Left arm, Patient Position: Lying)   Pulse 74   Temp 36.6 °C (97.8 °F) (Oral)   Resp 14   Ht 177.8 cm (70\")   Wt 82.2 kg (181 lb 2 oz)   SpO2 98%   BMI 25.99 kg/m²         "

## 2018-06-14 VITALS
RESPIRATION RATE: 16 BRPM | HEART RATE: 89 BPM | HEIGHT: 70 IN | BODY MASS INDEX: 25.93 KG/M2 | WEIGHT: 181.13 LBS | OXYGEN SATURATION: 96 % | TEMPERATURE: 99.1 F | SYSTOLIC BLOOD PRESSURE: 142 MMHG | DIASTOLIC BLOOD PRESSURE: 71 MMHG

## 2018-06-14 LAB
ANION GAP SERPL CALCULATED.3IONS-SCNC: 9.6 MMOL/L
BUN BLD-MCNC: 18 MG/DL (ref 8–23)
BUN/CREAT SERPL: 18.2 (ref 7–25)
CALCIUM SPEC-SCNC: 8.7 MG/DL (ref 8.6–10.5)
CHLORIDE SERPL-SCNC: 102 MMOL/L (ref 98–107)
CO2 SERPL-SCNC: 27.4 MMOL/L (ref 22–29)
CREAT BLD-MCNC: 0.99 MG/DL (ref 0.76–1.27)
GFR SERPL CREATININE-BSD FRML MDRD: 73 ML/MIN/1.73
GLUCOSE BLD-MCNC: 113 MG/DL (ref 65–99)
HCT VFR BLD AUTO: 34.5 % (ref 40.4–52.2)
HGB BLD-MCNC: 10.3 G/DL (ref 13.7–17.6)
POTASSIUM BLD-SCNC: 4.7 MMOL/L (ref 3.5–5.2)
SODIUM BLD-SCNC: 139 MMOL/L (ref 136–145)

## 2018-06-14 PROCEDURE — 80048 BASIC METABOLIC PNL TOTAL CA: CPT | Performed by: ORTHOPAEDIC SURGERY

## 2018-06-14 PROCEDURE — 97165 OT EVAL LOW COMPLEX 30 MIN: CPT

## 2018-06-14 PROCEDURE — 97535 SELF CARE MNGMENT TRAINING: CPT

## 2018-06-14 PROCEDURE — 85018 HEMOGLOBIN: CPT | Performed by: ORTHOPAEDIC SURGERY

## 2018-06-14 PROCEDURE — 99024 POSTOP FOLLOW-UP VISIT: CPT | Performed by: ORTHOPAEDIC SURGERY

## 2018-06-14 PROCEDURE — 97110 THERAPEUTIC EXERCISES: CPT

## 2018-06-14 PROCEDURE — 97150 GROUP THERAPEUTIC PROCEDURES: CPT

## 2018-06-14 PROCEDURE — 25010000003 CEFAZOLIN IN DEXTROSE 2-4 GM/100ML-% SOLUTION: Performed by: ORTHOPAEDIC SURGERY

## 2018-06-14 PROCEDURE — 85014 HEMATOCRIT: CPT | Performed by: ORTHOPAEDIC SURGERY

## 2018-06-14 RX ORDER — PSEUDOEPHEDRINE HCL 30 MG
100 TABLET ORAL 2 TIMES DAILY
Qty: 60 CAPSULE | Refills: 0 | Status: SHIPPED | OUTPATIENT
Start: 2018-06-14 | End: 2018-06-28

## 2018-06-14 RX ADMIN — MUPIROCIN: 20 OINTMENT TOPICAL at 09:12

## 2018-06-14 RX ADMIN — ASPIRIN 325 MG: 325 TABLET, DELAYED RELEASE ORAL at 09:12

## 2018-06-14 RX ADMIN — CEFAZOLIN SODIUM 2 G: 2 INJECTION, SOLUTION INTRAVENOUS at 04:40

## 2018-06-14 NOTE — PLAN OF CARE
Problem: Patient Care Overview  Goal: Plan of Care Review  Outcome: Ongoing (interventions implemented as appropriate)  Pt POD 1 LTH. Pain is controlled no pain medication given. VSS. Ambulating with therapy, and walker and stand by assist X1. Pt RF stool softners. Voiding per BRP without difficulty. Dressing is CDI. NVI. VSS. Educated pt on the importance of BP monitoring and the use of medications as ordered for HO HTN. Verbalized understanding. Demonstrated use of IS. Pt to be d/c'd home with HH today. Will cont to monitor.    06/14/18 0359 06/14/18 0936   Coping/Psychosocial   Plan of Care Reviewed With --  patient   Plan of Care Review   Progress improving --      Goal: Individualization and Mutuality  Outcome: Ongoing (interventions implemented as appropriate)    Goal: Discharge Needs Assessment  Outcome: Ongoing (interventions implemented as appropriate)    Goal: Interprofessional Rounds/Family Conf  Outcome: Ongoing (interventions implemented as appropriate)      Problem: Hip Arthroplasty (Total, Partial) (Adult)  Goal: Signs and Symptoms of Listed Potential Problems Will be Absent, Minimized or Managed (Hip Arthroplasty)  Outcome: Ongoing (interventions implemented as appropriate)    Goal: Anesthesia/Sedation Recovery  Outcome: Outcome(s) achieved Date Met: 06/14/18      Problem: Fall Risk (Adult)  Goal: Identify Related Risk Factors and Signs and Symptoms  Outcome: Ongoing (interventions implemented as appropriate)    Goal: Absence of Fall  Outcome: Ongoing (interventions implemented as appropriate)

## 2018-06-14 NOTE — THERAPY DISCHARGE NOTE
Acute Care - Occupational Therapy Initial Eval/Discharge  [facility]     Patient Name: Dong Cevallos  : 1940  MRN: [id number]  Today's Date: 2018  Onset of Illness/Injury or Date of Surgery: 18     Referring Physician: George Vargas      Admit Date: 2018       ICD-10-CM ICD-9-CM   1. Acute blood loss as cause of postoperative anemia D62 285.1   2. Arthritis of left hip M16.12 716.95   3. Difficulty walking R26.2 719.7     Patient Active Problem List   Diagnosis   • Hypertension   • Anxiety   • Osteoarthritis   • Seizure disorder   • Low back pain radiating to both legs   • Avascular necrosis of bones of both hips   • Arthritis of right hip   • Arthritis of left hip   • Degenerative disc disease, lumbar   • Chronic pain of both knees   • Pain in both knees   • Right inguinal hernia   • Status post total replacement of right hip     Past Medical History:   Diagnosis Date   • Bilateral hip pain    • History of anxiety    • History of prostate cancer    • History of seizures     NONE SINCE 2013   • Hypertension     IN PAST- SINCE WEIGHT LOSS OF 77 LBS   • Muscle weakness     LEGS   • Osteoarthritis    • PONV (postoperative nausea and vomiting)     IN , AFTER PROSTATECTOMY   • Stroke    • Unsteady gait      Past Surgical History:   Procedure Laterality Date   • APPENDECTOMY     • COLONOSCOPY      ALL OK   • INGUINAL HERNIA REPAIR Right 3/9/2018    Procedure: RIGHT INGUINAL HERNIA REPAIR WITH MESH;  Surgeon: Matt Zamora Jr., MD;  Location: [facility];  Service: General   • PROSTATECTOMY     • TOTAL HIP ARTHROPLASTY Right 2018    Procedure: RIGHT TOTAL HIP ARTHROPLASTY WITH CHRISTOPH NAVIGATION;  Surgeon: George Gonzalez MD;  Location: [facility] OR;  Service: Orthopedics   • TOTAL HIP ARTHROPLASTY Left 2018    Procedure: LEFT TOTAL HIP ARTHROPLASTY CHRISTOPH NAVIGATION;  Surgeon: George Gonzalez MD;  Location: [facility] OR;  Service: Orthopedics           OT ASSESSMENT FLOWSHEET (last 72 hours)      Occupational Therapy Evaluation     Row Name 06/14/18 0924                   OT Evaluation Time/Intention    Subjective Information no complaints  -SG        Document Type evaluation;discharge evaluation/summary  -SG        Mode of Treatment occupational therapy  -SG           General Information    Patient Profile Reviewed? yes  -SG        Patient Observations alert;cooperative;agree to therapy  -SG        General Observations of Patient sitting in bed  -SG        Prior Level of Function independent:;ADL's  -SG        Existing Precautions/Restrictions hip, posterior;left  -SG        Benefits Reviewed patient:;increase knowledge;improve function  -SG           Cognitive Assessment/Intervention- PT/OT    Orientation Status (Cognition) oriented x 3  -SG        Follows Commands (Cognition) WFL  -SG           ADL Assessment/Intervention    BADL Assessment/Intervention bathing;lower body dressing  -SG           Bathing Assessment/Intervention    Bathing [place] Level lower body  -SG        Assistive Devices (Bathing) --   educated with long handled spong to assist  -SG        Comment (Bathing) educated with hip precautions and AE to assist with LE adls. Pt states good knowledge and safety for hip precautions  -SG           Lower Body Dressing Assessment/Training    Lower Body Dressing [place] Level lower body dressing skills  -SG        Comment (Lower Body Dressing) educated with hip precautions and AE to assist with LE dress. Pt states has reacher and plans to order hip kit to obtain other equipment to assist. Instructed with technique for sock aid and reacher with LE dress  -SG           BADL Safety/Performance    Skilled BADL Treatment/Intervention BADL process/adaptation training;adaptive equipment training  -SG           Positioning and Restraints    Pre-Treatment Position in bed  -SG        Post Treatment Position bed  -SG        In Bed sitting;call light  within reach;encouraged to call for assist;exit alarm on  -SG           Pain Scale: Numbers Pre/Post-Treatment    Pain Scale: Numbers, Pretreatment 0/10 - no pain  -SG        Pain Scale: Numbers, Post-Treatment 0/10 - no pain  -SG           Wound 06/13/18 0959 Left hip incision    Wound - Properties Group Date first assessed: 06/13/18  - Time first assessed: 0959  - Side: Left  - Location: hip  -HH Type: incision  -HH       Plan of Care Review    Plan of Care Reviewed With patient  -SG           Clinical Impression (OT)    OT Diagnosis need for assist with personal care  -SG           Patient Education Goal (OT)    Activity (Patient Education Goal, OT) Pt to state safety for adls and hip precautions  -SG        [place]/Cues/Accuracy (Memory Goal 2, OT) verbalizes understanding  -SG        Time Frame (Patient Education Goal, OT) 1 day  -SG        Progress/Outcome (Patient Education Goal, OT) goal met  -SG          User Key  (r) = Recorded By, (t) = Taken By, (c) = Cosigned By    Initials Name Effective Dates     PRADEEP Villagomez 06/08/18 -      Sue Parrish RN 06/16/16 -           Occupational Therapy Education     Title: PT OT SLP Therapies (Done)     Topic: Occupational Therapy (Resolved)     Point: ADL training (Resolved)     Description: Instruct learner(s) on proper safety adaptation and remediation techniques during self care or transfers.   Instruct in proper use of assistive devices.   Learning Progress Summary     Learner Status Readiness Method Response Comment Documented by    Patient Done Acceptance E,TB,D VU Pt states good knowledge for hip precautions and AE to assist with safety for adls. Pt states has reacher and elevated commode. States plans to have assist of family at d/c and plans to order hip kit to assist  06/14/18 0934                      User Key     Initials Effective Dates Name Provider Type Discipline     06/08/18 -  PRADEEP Villagomez Occupational Therapist  OT                OT Recommendation and Plan     Plan of Care Review  Plan of Care Reviewed With: patient  Plan of Care Reviewed With: patient  Outcome Summary: Pt educated with adls and hip safety and AE to assist. Pt states good knowledge for hip safety and adls. Will not follow for further OT at this time           OT Rehab Goals     Row Name 06/14/18 0924             Patient Education Goal (OT)    Activity (Patient Education Goal, OT) Pt to state safety for adls and hip precautions  -SG      [place]/Cues/Accuracy (Memory Goal 2, OT) verbalizes understanding  -SG      Time Frame (Patient Education Goal, OT) 1 day  -SG      Progress/Outcome (Patient Education Goal, OT) goal met  -SG        User Key  (r) = Recorded By, (t) = Taken By, (c) = Cosigned By    Initials Name Provider Type Discipline    SG Nancy Valera OTR Occupational Therapist OT                Outcome Measures     Row Name 06/14/18 0937 06/13/18 1500          How much help from another person do you currently need...    Turning from your back to your side while in flat bed without using bedrails?  -- 4  -MS     Moving from lying on back to sitting on the side of a flat bed without bedrails?  -- 4  -MS     Moving to and from a bed to a chair (including a wheelchair)?  -- 3  -MS     Standing up from a chair using your arms (e.g., wheelchair, bedside chair)?  -- 3  -MS     Climbing 3-5 steps with a railing?  -- 3  -MS     To walk in hospital room?  -- 3  -MS     AM-PAC 6 Clicks Score  -- 20  -MS        How much help from another is currently needed...    Putting on and taking off regular lower body clothing? 2  -SG  --     Bathing (including washing, rinsing, and drying) 2  -SG  --     Toileting (which includes using toilet bed pan or urinal) 3  -SG  --     Putting on and taking off regular upper body clothing 3  -SG  --     Taking care of personal grooming (such as brushing teeth) 3  -SG  --     Eating meals 4  -SG  --     Score 17  -SG  --         Functional Assessment    Outcome Measure Options AM-PAC 6 Clicks Daily Activity (OT)  -SG AM-PAC 6 Clicks Basic Mobility (PT)  -MS       User Key  (r) = Recorded By, (t) = Taken By, (c) = Cosigned By    Initials Name Provider Type    GARO Valera OTR Occupational Therapist    MS Kev Ulloa, PT Physical Therapist          Time Calculation:         Time Calculation- OT     Row Name 06/14/18 0938             Time Calculation- OT    OT Start Time 0818  -SG      OT Stop Time 0842  -      OT Time Calculation (min) 24 min  -      Total Timed Code Minutes- OT 16 minute(s)  -      OT Received On 06/14/18  -        User Key  (r) = Recorded By, (t) = Taken By, (c) = Cosigned By    Initials Name Provider Type    PRADEEP Hudson Occupational Therapist        Therapy Suggested Charges     Code   Minutes Charges    None           Therapy Charges for Today     Code Description Service Date Service Provider Modifiers Qty    [id number]  OT EVAL LOW COMPLEXITY 2 6/14/2018 PRADEEP Villagomez GO 1    [id number]  OT SELF CARE/MGMT/TRAIN EA 15 MIN 6/14/2018 PRADEEP Villagomez GO 1               OT Discharge Summary  Reason for Discharge: other (comment) (Further OT not indicated at this time)    PRADEEP Villagomez  6/14/2018

## 2018-06-14 NOTE — PLAN OF CARE
Problem: Patient Care Overview  Goal: Plan of Care Review  Outcome: Ongoing (interventions implemented as appropriate)   06/14/18 0359   Coping/Psychosocial   Plan of Care Reviewed With patient   OTHER   Outcome Summary Pt has done well postop from [facility]. Ambulates well with use of walker and staff assistance. VOiding adequately. No c/o pain at this time, pt aware its available. Educaiton provided on BP monitoring and meds with HTN hx. Plans to d/c today.    Plan of Care Review   Progress improving     Goal: Individualization and Mutuality  Outcome: Ongoing (interventions implemented as appropriate)    Goal: Discharge Needs Assessment  Outcome: Ongoing (interventions implemented as appropriate)   06/13/18 0803 06/13/18 2033   Disability   Equipment Currently Used at Home walker, rolling;grab bar;bath bench --    Discharge Needs Assessment   Patient/Family Anticipates Transition to --  home with family;home with help/services   Patient/Family Anticipated Services at Transition --  home health care;   Transportation Concerns --  car, none   Transportation Anticipated --  family or friend will provide     Goal: Interprofessional Rounds/Family Conf  Outcome: Ongoing (interventions implemented as appropriate)   06/14/18 0359   Interdisciplinary Rounds/Family Conf   Participants nursing;patient       Problem: Hip Arthroplasty (Total, Partial) (Adult)  Goal: Signs and Symptoms of Listed Potential Problems Will be Absent, Minimized or Managed (Hip Arthroplasty)  Outcome: Ongoing (interventions implemented as appropriate)   06/14/18 0359   Goal/Outcome Evaluation   Problems Assessed (Hip Arthroplasty) functional deficit;pain;respiratory compromise;wound healing impaired;peripheral nerve impairment   Problems Present (Hip Arthroplasty) none     Goal: Anesthesia/Sedation Recovery  Outcome: Ongoing (interventions implemented as appropriate)   06/14/18 0359   Goal/Outcome Evaluation   Anesthesia/Sedation Recovery  progressing toward baseline       Problem: Fall Risk (Adult)  Goal: Absence of Fall  Outcome: Ongoing (interventions implemented as appropriate)   06/14/18 0359   Fall Risk (Adult)   Absence of Fall achieves outcome

## 2018-06-14 NOTE — DISCHARGE SUMMARY
Orthopedic Discharge Summary      Patient: Dong Cevallos  YOB: 1940  Medical Record Number: [id number]    Attending Physician: George Gonzalez MD  Consulting Physician(s): none  Agree with additional diagnoses per Consultant notes.    Date of Admission: 6/13/2018  7:13 AM  Date of Discharge: 6/14/18  Discharge Diagnosis: LEFT TOTAL HIP ARTHROPLASTY CHRISTOPH NAVIGATION,   Acute Blood Loss Anemia, stable  Post-operative Pain  Limited mobility, requires use of walker and assistance when OOB.    Presenting Problem/History of Present Illness: Arthritis of left hip [M16.12]  Arthritis of left hip [M16.12]        Allergies: No Known Allergies    Discharge Medications       Discharge Medications      New Medications      Instructions Start Date   aspirin 325 MG EC tablet   325 mg, Oral, Every 12 Hours Scheduled, X 1month and then 1 tab po daily      docusate sodium 100 MG capsule   100 mg, Oral, 2 Times Daily         Changes to Medications      Instructions Start Date   polyethylene glycol pack packet  Commonly known as:  MIRALAX  What changed:  · when to take this  · reasons to take this   17 g, Oral, Daily         Continue These Medications      Instructions Start Date   ENSURE PLUS PO   8 oz, Oral, Daily      NUTRITIONAL SHAKE PLUS PO   8 oz, Oral, 2 Times Daily      COMPLETE PROTEIN/VITAMIN SHAKE PO   8 oz, Oral, Daily      MULTIVITAMIN ADULT PO   1 tablet, Oral, Daily, LAST DOSE 6/6/18       vitamin B-12 1000 MCG tablet  Commonly known as:  CYANOCOBALAMIN   1,000 mcg, Oral, Daily, 2 CAPS PER DAY-LAST DOSE 6/6/18       B-12 1000 MCG sublingual tablet   1 tablet, Sublingual, Daily, LAST DOSE 6/6/18       vitamin D3 5000 units capsule capsule   5,000 Units, Oral, Daily, 3 CAPS PER DAY LAST DOSE  6/6/16          Stop These Medications    cephalexin 500 MG capsule  Commonly known as:  KEFLEX              Past Medical History:   Diagnosis Date   • Bilateral hip pain    • History of anxiety    • History of  prostate cancer    • History of seizures     NONE SINCE 9/2013   • Hypertension     IN PAST- SINCE WEIGHT LOSS OF 77 LBS   • Muscle weakness     LEGS   • Osteoarthritis    • PONV (postoperative nausea and vomiting)     IN 2012, AFTER PROSTATECTOMY   • Stroke 2008   • Unsteady gait         Past Surgical History:   Procedure Laterality Date   • APPENDECTOMY     • COLONOSCOPY  2005    ALL OK   • INGUINAL HERNIA REPAIR Right 3/9/2018    Procedure: RIGHT INGUINAL HERNIA REPAIR WITH MESH;  Surgeon: Matt Zamora Jr., MD;  Location: [facility] OR;  Service: General   • PROSTATECTOMY  2012   • TOTAL HIP ARTHROPLASTY Right 4/9/2018    Procedure: RIGHT TOTAL HIP ARTHROPLASTY WITH CHRISTOPH NAVIGATION;  Surgeon: George Gonzalez MD;  Location: [facility] OR;  Service: Orthopedics   • TOTAL HIP ARTHROPLASTY Left 6/13/2018    Procedure: LEFT TOTAL HIP ARTHROPLASTY CHRISTOPH NAVIGATION;  Surgeon: George Gonzalez MD;  Location: [facility] OR;  Service: Orthopedics        Social History     Occupational History   • Not on file.     Social History Main Topics   • Smoking status: Never Smoker   • Smokeless tobacco: Never Used   • Alcohol use No   • Drug use: No   • Sexual activity: Defer    Social History     Social History Narrative   • No narrative on file        Family History   Problem Relation Age of Onset   • Malig Hyperthermia Neg Hx          Physical Exam: 77 y.o. male   Body mass index is 25.99 kg/m².  Facility age limit for growth percentiles is 20 years.  Vitals:    06/14/18 0826   BP: 157/87   Pulse: 90   Resp: 16   Temp: 98.4 °F (36.9 °C)   SpO2: 98%         General Appearance:    Alert, cooperative, in no acute distress                    Vitals:    06/13/18 1900 06/13/18 2300 06/14/18 0439 06/14/18 0826   BP: 138/82 136/77 127/72 157/87   BP Location: Left arm Left arm Left arm Left arm   Patient Position: Lying Lying Lying Lying   Pulse: 97 86 79 90   Resp: 16 16 16 16   Temp: 97.6 °F (36.4 °C) 97.4 °F (36.3 °C) 97.5  °F (36.4 °C) 98.4 °F (36.9 °C)   TempSrc: Oral  Oral Oral   SpO2: 98% 99% 96% 98%   Weight:       Height:            DIAGNOSTIC TESTS:   Admission on 06/13/2018   Component Date Value Ref Range Status   • Glucose 06/14/2018 113* 65 - 99 mg/dL Final   • BUN 06/14/2018 18  8 - 23 mg/dL Final   • Creatinine 06/14/2018 0.99  0.76 - 1.27 mg/dL Final   • Sodium 06/14/2018 139  136 - 145 mmol/L Final   • Potassium 06/14/2018 4.7  3.5 - 5.2 mmol/L Final   • Chloride 06/14/2018 102  98 - 107 mmol/L Final   • CO2 06/14/2018 27.4  22.0 - 29.0 mmol/L Final   • Calcium 06/14/2018 8.7  8.6 - 10.5 mg/dL Final   • eGFR Non  Amer 06/14/2018 73  >60 mL/min/1.73 Final   • BUN/Creatinine Ratio 06/14/2018 18.2  7.0 - 25.0 Final   • Anion Gap 06/14/2018 9.6  mmol/L Final   • Hemoglobin 06/14/2018 10.3* 13.7 - 17.6 g/dL Final   • Hematocrit 06/14/2018 34.5* 40.4 - 52.2 % Final       Imaging Results (last 72 hours)     Procedure Component Value Units Date/Time    XR Hip 1 View Without Pelvis Left (Surgery Only) [[id number]] Collected:  06/13/18 1144     Updated:  06/13/18 1144    Narrative:       AP PORTABLE VIEW OF THE LEFT HIP 06/13/2018      HISTORY: Postop left hip replacement.     FINDINGS: The acetabular and proximal femoral components of the left hip  prosthesis are well seated with no abnormal surrounding bony lucencies.  Soft tissue air is seen. No fractures or dislocations are seen.       Impression:       Satisfactory postoperative appearance of the left hip.             Hospital Course:  77 y.o. male admitted to [facility] to services of George Gonzalez MD with Arthritis of left hip [M16.12]  Arthritis of left hip [M16.12] on 6/13/2018 and underwent LEFT TOTAL HIP ARTHROPLASTY CHRISTOPH NAVIGATION  Per George Gonzalez MD. Antibiotic and VTE prophylaxis were per [facility] protocols. Post-operatively the patient transferred to the post-operative floor where the patient underwent mobilization therapy that included active as  well as passive ROM exercises. Opioids were titrated to achieve appropriate pain management to allow for participation in mobilization exercises. Vital signs are now stable. On the day of discharge the wound was clean, dry and intact and calf was soft and non tender and Homans sign was negative. Operative extremity neurovascular status remains intact.   Appropriate education re: incision care, activity levels, medications, and follow up visits was completed and all questions were answered. The patient is now deemed stable for discharge.    Condition on Discharge:  Stable    Discharge Instructions: Patient is to continue with physical therapy exercises twice daily and continue working with the physical therapist as ordered. Patient may weight bear as tolerated unless otherwise specified. Continue to ice regularly. Patient instructed on frequent calf pumping exercises.  Patient also instructed on incentive spirometer during hospitalization and encouraged to continue to use at home regularly.  See wound care discharge instructions.    Follow up Instructions:  Follow up in the office with Dr. George Man in 2 weeks - If already scheduled see below (Future Appointments) for date and time, if not yet scheduled, patient to call the office at [phone] to schedule. Prescriptions were given for pain medication, constipation, and blood thinner therapy.    Future Appointments  Date Time Provider Department Center   6/28/2018 11:00 AM George Man MD [facility] None     Additional Instructions for the Follow-ups that You Need to Schedule     Ambulatory Referral to Home Health    As directed      Face to Face Visit Date:  6/14/2018    Follow-up Provider for Plan of Care?:  I will be treating the patient on an ongoing basis.  Please send me the Plan of Care for signature.    Follow-up Provider:  GEORGE MAN [[id number]]    Reason/Clinical Findings:  post surgical    Describe mobility limitations that make leaving home  difficult:  Requires the assistance of another to leave home    Nursing/Therapeutic Services Requested:  Physical Therapy    PT orders:  Total joint pathway    Frequency:  1 Week 1         Discharge Follow-up with Specified Provider: George Gonzalez M.D. at [facility] [phone]; 2 Weeks    As directed      To:  George Gonzalez M.D. at [facility] [phone]    Follow Up:  2 Weeks         Dressing Change Instructions    As directed      IV. INCISION CARE: For Total Hip Replacement: Do not change dressing unless saturated.  On POD#5 May shower but keep dressing in place and pat dry.  May remove dressing on post op day #10 and shower:  the wound should be gently cleaned with antibacterial soap then allowed to dry. Do NOT scrub the glue at the incision site, pat dry after shower. If there is any drainage, the wound should be covered at all times until drainage has stopped and incision is sealed off.  Call if any unusual drainage past POD#5, pus, redness, or swelling.     General Care TIPS:  Wash your hands prior to dressing changes  Change the dressing as needed to keep incision clean and dry. Utilize dry gauze and paper tape. Avoid touching the side of the gauze that goes against the incision with your hands.  No creams or ointments to the incision  May remove dressing once the incision is free of drainage  Do not touch or pick at the incision  Check incision every day and notify surgeon immediately if any of the following signs or symptoms are noted:  Increase in redness  Increase in swelling around the incision and of the entire extremity  Increase in pain  Drainage oozing from the incision  Pulling apart of the edges of the incision  Increase in overall body temperature (greater than 100.5 degrees) Make sure you are doing your incentive spirometer and deep breathing exercises every day while awake as this is the most frequent cause of low grade fever  post operatively.      V. Medications:   1. Anticoagulants: You will be discharged on an anticoagulant. This is a prophylactic medication that helps prevent blood clots during your post-operative period. The type and length of dosage varies based on your individual needs, procedure performed, and surgeon's preference.  While taking the anticoagulant, you should avoid taking any additional aspirin, ibuprofen (Advil or Motrin), Aleve (Naprosyn) or other non-steroidal anti-inflammatory medications.   Notify surgeon immediately if any marlene bleeding is noted in the urine, stool, emesis, or from the nose or the incision. Blood in the stool will often appear as black rather than red. Blood in urine may appear as pink. Blood in emesis may appear as brown/black like coffee grounds.  You will need to apply pressure for longer periods of time to any cuts or abrasions to stop bleeding  Avoid alcohol while taking anticoagulants    2. Stool Softeners: You will be at greater risk of constipation after surgery due to being less mobile and the pain medications.   Take over the counter stool softeners (such as colace, Milk of Magnesia, dulcolax, miralax, etc.) as needed to mainatin bowel movements while on pain medications. Over the counter Miralax 1-2 times daily, or Colace 100 mg 1-2 capsules twice daily to start with and add additional as needed.   If stools become too loose or too frequent, please decreases the dosage or stop the stool softener.  If constipation occurs despite use of stool softeners, you are to continue the stool softeners and add a laxative (Milk of Magnesia 1 ounce daily as needed)  Drink plenty of fluids, and eat fruits and vegetables during your recovery time    3. Pain Medications utilized after surgery are narcotics and the law requires that the following information be given to all patients that are prescribed narcotics:  CLASSIFICATION: Pain medications are called Opioids and are narcotics  LEGALITIES:  It is illegal to share narcotics with others and to drive within 24 hours of taking narcotics  POTENTIAL SIDE EFFECTS: Potential side effects of opioids include: nausea, vomiting, itching, dizziness, drowsiness, dry mouth, constipation, and difficulty urinating.  POTENTIAL ADVERSE EFFECTS:   Opioid tolerance can develop with use of pain medications and this simply means that it requires more and more of the medication to control pain; however, this is seen more in patients that use opioids for longer periods of time.  Opioid dependence can develop with use of Opioids and this simply means that to stop the medication can cause withdrawal symptoms, so wean off gradually when the pain lessens; however, this is seen with patients that use Opioids for longer periods of time.  Opioid addiction can develop with use of Opioids and the incidence of this is very unlikely in patients who take the medications as ordered and stop the medications as instructed.  Opioid overdose can be dangerous, but is unlikely when the medication is taken as ordered and stopped when ordered. It is important not to mix opioids with alcohol or with and type of sedative such as Benadryl as this can lead to over sedation and respiratory difficulty.  DOSAGE:   Pain medications will need to be taken consistently for the first week to decrease pain and promote adequate pain relief and participation in physical therapy.  After the initial surgical pain begins to resolve, you may begin to decrease the pain medication. By the end of 6-8 weeks, you should be off of pain medications.  Refills will not be given by the office during evening hours, on weekends, or after 8 weeks post-op.  To seek refills on pain medications during the initial 6 week post-operative period, you must call the office 48 hours in advance to request the refill. The office will then notify you when to  the prescription. DO NOT wait until you are out of the medication to request  a refill.    V. FOLLOW-UP VISITS:  If you have any concerns or suspected complications prior to your follow up visit, please call Dr. Gonzalez's office at [phone]. Do not wait until your appointment time if you suspect complications. These will need to be addressed in the office promptly.    Order Comments:  IV. INCISION CARE: For Total Hip Replacement: Do not change dressing unless saturated.  On POD#5 May shower but keep dressing in place and pat dry.  May remove dressing on post op day #10 and shower:  the wound should be gently cleaned with antibacterial soap then allowed to dry. Do NOT scrub the glue at the incision site, pat dry after shower. If there is any drainage, the wound should be covered at all times until drainage has stopped and incision is sealed off.  Call if any unusual drainage past POD#5, pus, redness, or swelling.  General Care TIPS: Wash your hands prior to dressing changes Change the dressing as needed to keep incision clean and dry. Utilize dry gauze and paper tape. Avoid touching the side of the gauze that goes against the incision with your hands. No creams or ointments to the incision May remove dressing once the incision is free of drainage Do not touch or pick at the incision Check incision every day and notify surgeon immediately if any of the following signs or symptoms are noted: Increase in redness Increase in swelling around the incision and of the entire extremity Increase in pain Drainage oozing from the incision Pulling apart of the edges of the incision Increase in overall body temperature (greater than 100.5 degrees) Make sure you are doing your incentive spirometer and deep breathing exercises every day while awake as this is the most frequent cause of low grade fever post operatively. V. Medications: 1. Anticoagulants: You will be discharged on an anticoagulant. This is a prophylactic medication that helps prevent blood clots during your post-operative period. The type and  length of dosage varies based on your individual needs, procedure performed, and surgeon's preference. While taking the anticoagulant, you should avoid taking any additional aspirin, ibuprofen (Advil or Motrin), Aleve (Naprosyn) or other non-steroidal anti-inflammatory medications. Notify surgeon immediately if any marlene bleeding is noted in the urine, stool, emesis, or from the nose or the incision. Blood in the stool will often appear as black rather than red. Blood in urine may appear as pink. Blood in emesis may appear as brown/black like coffee grounds. You will need to apply pressure for longer periods of time to any cuts or abrasions to stop bleeding Avoid alcohol while taking anticoagulants 2. Stool Softeners: You will be at greater risk of constipation after surgery due to being less mobile and the pain medications. Take over the counter stool softeners (such as colace, Milk of Magnesia, dulcolax, miralax, etc.) as needed to mainatin bowel movements while on pain medications. Over the counter Miralax 1-2 times daily, or Colace 100 mg 1-2 capsules twice daily to start with and add additional as needed. If stools become too loose or too frequent, please decreases the dosage or stop the stool softener. If constipation occurs despite use of stool softeners, you are to continue the stool softeners and add a laxative (Milk of Magnesia 1 ounce daily as needed) Drink plenty of fluids, and eat fruits and vegetables during your recovery time 3. Pain Medications utilized after surgery are narcotics and the law requires that the following information be given to all patients that are prescribed narcotics: CLASSIFICATION: Pain medications are called Opioids and are narcotics LEGALITIES: It is illegal to share narcotics with others and to drive within 24 hours of taking narcotics POTENTIAL SIDE EFFECTS: Potential side effects of opioids include: nausea, vomiting, itching, dizziness, drowsiness, dry mouth, constipation,  and difficulty urinating. POTENTIAL ADVERSE EFFECTS: Opioid tolerance can develop with use of pain medications and this simply means that it requires more and more of the medication to control pain; however, this is seen more in patients that use opioids for longer periods of time. Opioid dependence can develop with use of Opioids and this simply means that to stop the medication can cause withdrawal symptoms, so wean off gradually when the pain lessens; however, this is seen with patients that use Opioids for longer periods of time. Opioid addiction can develop with use of Opioids and the incidence of this is very unlikely in patients who take the medications as ordered and stop the medications as instructed. Opioid overdose can be dangerous, but is unlikely when the medication is taken as ordered and stopped when ordered. It is important not to mix opioids with alcohol or with and type of sedative such as Benadryl as this can lead to over sedation and respiratory difficulty. DOSAGE: Pain medications will need to be taken consistently for the first week to decrease pain and promote adequate pain relief and participation in physical therapy. After the initial surgical pain begins to resolve, you may begin to decrease the pain medication. By the end of 6-8 weeks, you should be off of pain medications. Refills will not be given by the office during evening hours, on weekends, or after 8 weeks post-op. To seek refills on pain medications during the initial 6 week post-operative period, you must call the office 48 hours in advance to request the refill. The office will then notify you when to  the prescription. DO NOT wait until you are out of the medication to request a refill. V. FOLLOW-UP VISITS: If you have any concerns or suspected complications prior to your follow up visit, please call Dr. Gonzalez's office at [phone]. Do not wait until your appointment time if you suspect complications. These will need  to be addressed in the office promptly.              Patient has Percocet at home from previous hip surgery 2 months ago and does not want new RX.  Has all of his equipment at home.      Discharge Disposition Plan:today to home health    Date: 6/14/2018    Madie Garcia RN

## 2018-06-14 NOTE — PLAN OF CARE
Problem: Patient Care Overview  Goal: Plan of Care Review   06/14/18 0936   Coping/Psychosocial   Plan of Care Reviewed With patient   OTHER   Outcome Summary Pt educated with adls and hip safety and AE to assist. Pt states good knowledge for hip safety and adls. Will not follow for further OT at this time

## 2018-06-14 NOTE — PLAN OF CARE
Problem: Patient Care Overview  Goal: Plan of Care Review  Outcome: Ongoing (interventions implemented as appropriate)   06/13/18 2001   Coping/Psychosocial   Plan of Care Reviewed With patient   OTHER   Outcome Summary Pt admitted today spLeft SAMIA,ABD pillow in place, NKA, VSS, afebrile, no c/o pain voiced, pt does have excoriated area on buttocks and groin skin folds, educated pt on monitoring BP 2/2 HTN.   Plan of Care Review   Progress improving       Problem: Hip Arthroplasty (Total, Partial) (Adult)  Goal: Signs and Symptoms of Listed Potential Problems Will be Absent, Minimized or Managed (Hip Arthroplasty)  Outcome: Ongoing (interventions implemented as appropriate)      Problem: Fall Risk (Adult)  Goal: Identify Related Risk Factors and Signs and Symptoms  Outcome: Outcome(s) achieved Date Met: 06/13/18    Goal: Absence of Fall  Outcome: Ongoing (interventions implemented as appropriate)

## 2018-06-14 NOTE — PROGRESS NOTES
Discharge Planning Assessment  [facility]     Patient Name: Dong Cevallos  MRN: [id number]  Today's Date: 6/14/2018    Admit Date: 6/13/2018          Discharge Needs Assessment     Row Name 06/14/18 1617       Living Environment    Lives With child(estefany), adult;grandchild(estefany)    Current Living Arrangements home/apartment/condo       Discharge Needs Assessment    Readmission Within the Last 30 Days no previous admission in last 30 days    Concerns to be Addressed basic needs    Equipment Needed After Discharge walker, rolling    Discharge Facility/Level of Care Needs home with home health            Discharge Plan     Row Name 06/14/18 1617       Plan    Plan [facility]    Patient/Family in Agreement with Plan yes    Plan Comments Spoke with pt, verified correct information on facesheet and explained the role of CCP. Pt would like to d/c home with [facility], referral given to Nica with [facility] who states they are able to accept. Plan will be to d/c home with HH and family support.    Final Discharge Disposition Code 06 - home with home health care    Final Note Pt d/c'ed home with [facility].        Destination     No service coordination in this encounter.      Durable Medical Equipment     No service coordination in this encounter.      Dialysis/Infusion     No service coordination in this encounter.      Home Medical Care - Selection Complete     Service Request Status Selected Specialties Address Phone Number Fax Number    [facility] Selected Home Health Services [address] [place] [address] [phone]      Social Care     No service coordination in this encounter.        Expected Discharge Date and Time     Expected Discharge Date Expected Discharge Time    Jun 14, 2018               Demographic Summary    No documentation.           Functional Status    No documentation.           Psychosocial    No documentation.           Abuse/Neglect    No documentation.            Legal    No documentation.           Substance Abuse    No documentation.           Patient Forms    No documentation.         Tiffanie Pantoja RN

## 2018-06-15 NOTE — THERAPY TREATMENT NOTE
Acute Care - Physical Therapy Treatment Note  [facility]     Patient Name: Dong Cevallos  : 1940  MRN: [id number]  Today's Date: 6/15/2018  Onset of Illness/Injury or Date of Surgery: 18  Date of Referral to PT: 18  Referring Physician: George Vargas    Admit Date: 2018    Visit Dx:    ICD-10-CM ICD-9-CM   1. Acute blood loss as cause of postoperative anemia D62 285.1   2. Arthritis of left hip M16.12 716.95   3. Difficulty walking R26.2 719.7     Patient Active Problem List   Diagnosis   • Hypertension   • Anxiety   • Osteoarthritis   • Seizure disorder   • Low back pain radiating to both legs   • Avascular necrosis of bones of both hips   • Arthritis of right hip   • Arthritis of left hip   • Degenerative disc disease, lumbar   • Chronic pain of both knees   • Pain in both knees   • Right inguinal hernia   • Status post total replacement of right hip       Therapy Treatment          Rehabilitation Treatment Summary     Row Name                Wound 18 0959 Left hip incision    Wound - Properties Group Date first assessed: 18 [] Time first assessed: 959 [] Side: Left [] Location: hip [] Type: incision [] Recorded by:  [] Sue Parrish RN 18 0959      User Key  (r) = Recorded By, (t) = Taken By, (c) = Cosigned By    Initials Name Effective Dates Discipline     Sue Parrish RN 16 -  Nurse                     Physical Therapy Education     Title: PT OT SLP Therapies (Resolved)     Topic: Physical Therapy (Resolved)     Point: Mobility training (Resolved)    Learning Progress Summary     Learner Status Readiness Method Response Comment Documented by    Patient Done Acceptance NAT DEMPSEY  ST 18     Done Acceptance LUANA DEMPSEY NR  MS 18 1548          Point: Home exercise program (Resolved)    Learning Progress Summary     Learner Status Readiness Method Response Comment Documented by    Patient Done Acceptance NAT DEMPSEY  ST  06/13/18 2001     Done Acceptance ELUANA,NR  MS 06/13/18 1548          Point: Body mechanics (Resolved)    Learning Progress Summary     Learner Status Readiness Method Response Comment Documented by    Patient Done Acceptance NAT DEMPSEY  ST 06/13/18 2001     Done Acceptance ELUANA,NR  MS 06/13/18 1548          Point: Precautions (Resolved)    Learning Progress Summary     Learner Status Readiness Method Response Comment Documented by    Patient Done Acceptance NAT DEMPSEY VU  ST 06/13/18 2001     Done Acceptance LUANA DEMPSEY,NR  MS 06/13/18 1548                      User Key     Initials Effective Dates Name Provider Type Discipline    MS 04/03/18 -  Kev Ulloa, PT Physical Therapist PT    ST 10/13/17 -  Stacie Alvarez, RN Registered Nurse Nurse                    PT Recommendation and Plan                Outcome Measures     Row Name 06/14/18 0937 06/13/18 1500          How much help from another person do you currently need...    Turning from your back to your side while in flat bed without using bedrails?  -- 4  -MS     Moving from lying on back to sitting on the side of a flat bed without bedrails?  -- 4  -MS     Moving to and from a bed to a chair (including a wheelchair)?  -- 3  -MS     Standing up from a chair using your arms (e.g., wheelchair, bedside chair)?  -- 3  -MS     Climbing 3-5 steps with a railing?  -- 3  -MS     To walk in hospital room?  -- 3  -MS     AM-PAC 6 Clicks Score  -- 20  -MS        How much help from another is currently needed...    Putting on and taking off regular lower body clothing? 2  -SG  --     Bathing (including washing, rinsing, and drying) 2  -SG  --     Toileting (which includes using toilet bed pan or urinal) 3  -SG  --     Putting on and taking off regular upper body clothing 3  -SG  --     Taking care of personal grooming (such as brushing teeth) 3  -SG  --     Eating meals 4  -SG  --     Score 17  -SG  --        Functional Assessment    Outcome Measure Options AM-PAC 6 Clicks  Daily Activity (OT)  -SG AM-PAC 6 Clicks Basic Mobility (PT)  -MS       User Key  (r) = Recorded By, (t) = Taken By, (c) = Cosigned By    Initials Name Provider Type    GARO Valera, OTR Occupational Therapist    MS Kev Ulloa, PT Physical Therapist           Time Calculation:     Therapy Suggested Charges     Code   Minutes Charges    69368 (CPT®) Hc Pt Neuromusc Re Education Ea 15 Min      81904 (CPT®) Hc Pt Ther Proc Ea 15 Min 20 1    88316 (CPT®) Hc Gait Training Ea 15 Min      08957 (CPT®) Hc Pt Therapeutic Act Ea 15 Min      38046 (CPT®) Hc Pt Manual Therapy Ea 15 Min      25135 (CPT®) Hc Pt Iontophoresis Ea 15 Min      85109 (CPT®) Hc Pt Elec Stim Ea-Per 15 Min      17865 (CPT®) Hc Pt Ultrasound Ea 15 Min      00868 (CPT®) Hc Pt Self Care/Mgmt/Train Ea 15 Min      Total  20 1        Therapy Charges for Today     Code Description Service Date Service Provider Modifiers Qty    67762317363 HC PT THER PROC EA 15 MIN 6/14/2018 Melissa Amaro, PTA GP 1    30682116991 HC PT THER PROC GROUP 6/14/2018 Melissa Amaro, PTA GP 1          PT G-Codes  Outcome Measure Options: AM-PAC 6 Clicks Daily Activity (OT)    Melissa Amaro PTA  6/15/2018

## 2018-06-18 ENCOUNTER — TELEPHONE (OUTPATIENT)
Dept: ORTHOPEDIC SURGERY | Facility: CLINIC | Age: 78
End: 2018-06-18

## 2018-06-18 NOTE — TELEPHONE ENCOUNTER
----- Message from Dong Cevallos sent at 6/18/2018  3:56 PM EDT -----  Regarding: Visit Follow-Up Question  Contact: [phone]  Second (small) bandage has blister near it.  ??

## 2018-06-18 NOTE — TELEPHONE ENCOUNTER
Blisters can come from the soft tissue swelling.  The main thing is to keep it covered and clean in order to avoid infection.  If the blister ruptures, clean gently with soap and water and apply antibiotic ointment and cover with a dressing until scabbed over.

## 2018-06-18 NOTE — TELEPHONE ENCOUNTER
SX 06/13/18 LEFT TOTAL HIP ARTHROPLASTY CHRISTOPH NAVIGATION-left     Main concern for this patient-   States the Blister is-yellow and shiny looks like a lima bean aprox 1 in long/oblong. Denies drainage   not able to really see anything due to area the blister is located/below main incision.     Patient would like reassurance from SPM

## 2018-06-28 ENCOUNTER — OFFICE VISIT (OUTPATIENT)
Dept: ORTHOPEDIC SURGERY | Facility: CLINIC | Age: 78
End: 2018-06-28

## 2018-06-28 VITALS — BODY MASS INDEX: 25.91 KG/M2 | HEIGHT: 70 IN | TEMPERATURE: 99.2 F | WEIGHT: 181 LBS

## 2018-06-28 DIAGNOSIS — Z96.642 STATUS POST TOTAL REPLACEMENT OF LEFT HIP: Primary | ICD-10-CM

## 2018-06-28 PROCEDURE — 72170 X-RAY EXAM OF PELVIS: CPT | Performed by: ORTHOPAEDIC SURGERY

## 2018-06-28 PROCEDURE — 99024 POSTOP FOLLOW-UP VISIT: CPT | Performed by: ORTHOPAEDIC SURGERY

## 2018-06-28 NOTE — PROGRESS NOTES
Dong Cevallos : 1940 MRN: [id number] DATE: 2018  Body mass index is 25.97 kg/m².  Vitals:    18 1050   Temp: 99.2 °F (37.3 °C)       DIAGNOSIS: 2 week follow up left total hip     SUBJECTIVE:Patient returns today for 2 week follow up of left total hip replacement. Patient reports doing well with no unusual complaints. Appears to be progressing appropriately. Is walking well with walker today (this is the first time I have seen him walk) usually in w/c in the office!!!    OBJECTIVE:   Exam:. The incision is healing appropriately. No sign of infection. Range of motion is progressing as expected. The calf is soft and nontender with a negative Homans sign.    DIAGNOSTIC STUDIES  2V AP&Lat of the left hip were done in the office today  Indication, findings and comparison: images were reviewed for evaluation of recent hip replacement. They demonstrate a well positioned, well aligned hip replacement without complicating factors noted. In comparison with previous films there has been interval implant placement.    ASSESSMENT: 2 week status post left hip replacement expected healing.    PLAN: 1) Incision open to air.   2) Continue PT and pain medicine (as needed)   3) Continue ice PRN   4) Continue EC Aspirin 325mg by mouth twice a day until 6 weeks post op.   5) Follow up in 4 weeks with repeat Xrays of left hip (2 views)   6) Continue with antibiotic prophylaxis with dental procedures for 2 yrs post total joint replacement.    Cecile Verdugo, APRN  2018

## 2018-06-28 NOTE — PATIENT INSTRUCTIONS
DIAGNOSIS: 2 week follow up left total hip     SUBJECTIVE:Patient returns today for 2 week follow up of left total hip replacement. Patient reports doing well with no unusual complaints. Appears to be progressing appropriately. Is walking well with walker today (this is the first time I have seen him walk) usually in w/c in the office!!!    OBJECTIVE:   Exam:. The incision is healing appropriately. No sign of infection. Range of motion is progressing as expected. The calf is soft and nontender with a negative Homans sign.    DIAGNOSTIC STUDIES  2V AP&Lat of the left hip were done in the office today  Indication, findings and comparison: images were reviewed for evaluation of recent hip replacement. They demonstrate a well positioned, well aligned hip replacement without complicating factors noted. In comparison with previous films there has been interval implant placement.    ASSESSMENT: 2 week status post left hip replacement expected healing.    PLAN: 1) Incision open to air.   2) Continue PT and pain medicine (as needed)   3) Continue ice PRN   4) Continue EC Aspirin 325mg by mouth twice a day until 6 weeks post op.   5) Follow up in 4 weeks with repeat Xrays of left hip (2 views)   6) Continue with antibiotic prophylaxis with dental procedures for 2 yrs post total joint replacement.    Cecile Verdugo, APRN  6/28/2018

## 2018-07-27 ENCOUNTER — OFFICE VISIT (OUTPATIENT)
Dept: ORTHOPEDIC SURGERY | Facility: CLINIC | Age: 78
End: 2018-07-27

## 2018-07-27 VITALS — WEIGHT: 181 LBS | TEMPERATURE: 99.5 F | BODY MASS INDEX: 25.91 KG/M2 | HEIGHT: 70 IN

## 2018-07-27 DIAGNOSIS — Z96.641 HISTORY OF TOTAL RIGHT HIP REPLACEMENT: ICD-10-CM

## 2018-07-27 DIAGNOSIS — Z96.642 STATUS POST TOTAL REPLACEMENT OF LEFT HIP: Primary | ICD-10-CM

## 2018-07-27 PROCEDURE — 73502 X-RAY EXAM HIP UNI 2-3 VIEWS: CPT | Performed by: NURSE PRACTITIONER

## 2018-07-27 NOTE — PATIENT INSTRUCTIONS
Chief Complaint and HPI: 6 weeks follow up left total hip     SUBJECTIVE:Patient returns today for follow up of total joint replacement. Patient reports doing well with no unusual complaints. Appears to be progressing appropriately.     OBJECTIVE:   Exam:. The incision is healing appropriately. No sign of infection. Range of motion is progressing as expected. The calf is soft and nontender with a negative Homans sign.     DIAGNOSTIC STUDIES  Imaging done today, images were personally viewed and discussed with the patient:     Indication, findings and comparison: 2V AP & Lat of the operative joint were done in the office today  images were reviewed for evaluation of recent joint replacement. They demonstrate a well positioned, well aligned total joint replacement without complicating factors noted. In comparison with previous films there has been no alignment change.     ASSESSMENT: status post left total hip replacement expected healing.     PLAN: 1) Continue with PT exercises as prescribed              2) Follow up 9 months  WITH XRAYS (2 views of same joint).              3) Continue with antibiotic prophylaxis with dental procedures for 2 yrs post total joint replacement.              4) May discontinue anticoagulant therapy (such as aspirin or xarelto) from surgery at this time and resume medications, vitamins, and supplements you were taking before surgery.      Cecile Verdugo, APRN  7/27/2018

## 2018-07-27 NOTE — PROGRESS NOTES
Dong Cevallos : 1940 MRN: [id number] DATE: 2018  Body mass index is 25.97 kg/m².  Vitals:    18 1014   Temp: 99.5 °F (37.5 °C)       Chief Complaint and HPI: 6 weeks follow up left total hip    SUBJECTIVE:Patient returns today for follow up of total joint replacement. Patient reports doing well with no unusual complaints. Appears to be progressing appropriately.    OBJECTIVE:   Exam:. The incision is healing appropriately. No sign of infection. Range of motion is progressing as expected. The calf is soft and nontender with a negative Homans sign.    DIAGNOSTIC STUDIES  Imaging done today, images were personally viewed and discussed with the patient:    Indication, findings and comparison: 2V AP & Lat of the operative joint were done in the office today  images were reviewed for evaluation of recent joint replacement. They demonstrate a well positioned, well aligned total joint replacement without complicating factors noted. In comparison with previous films there has been no alignment change.    ASSESSMENT: status post left total hip replacement expected healing.    PLAN: 1) Continue with PT exercises as prescribed   2) Follow up 9 months  WITH XRAYS (2 views of same joint).   3) Continue with antibiotic prophylaxis with dental procedures for 2 yrs post total joint replacement.   4) May discontinue anticoagulant therapy (such as aspirin or xarelto) from surgery at this time and resume medications, vitamins, and supplements you were taking before surgery.     Cecile Verdugo, APRN  2018

## 2021-03-21 NOTE — PROGRESS NOTES
"   History & Physical       Patient: Dong Cevallos  YOB: 1940  Medical Record Number: [id number]  Wt Readings from Last 3 Encounters:   04/06/18 74.4 kg (164 lb)   04/04/18 74.5 kg (164 lb 3.2 oz)   03/22/18 74.5 kg (164 lb 3.2 oz)     Ht Readings from Last 3 Encounters:   04/06/18 177.8 cm (70\")   04/04/18 177.8 cm (70\")   03/22/18 177.8 cm (70\")     Body mass index is 23.53 kg/m².  Facility age limit for growth percentiles is 20 years.    Surgeon:  Dr. George Gonzalez    Chief Complaints:   Chief Complaint   Patient presents with   • Right Hip - Follow-up, Pain       Subjective:    History of Present Illness: 77 y.o. male presents with   Chief Complaint   Patient presents with   • Right Hip - Follow-up, Pain   . Onset of symptoms was years ago and has been progressively worsening despite more conservative treatment measures.  Symptoms are associated with ability to move, exercise, and perform activities of daily living.  Symptoms are aggravated by weight bearing and ROM necessary for activities of daily living.   Symptoms improve with rest, ice and elevation only minimally.      Allergies:   Allergies   Allergen Reactions   • Asa [Aspirin] GI Bleeding       Medications:   Home Medications:  Current Outpatient Prescriptions on File Prior to Visit   Medication Sig   • Chlorhexidine Gluconate Cloth 2 % pads Apply  topically. AS DIRECTED PREOP   • Cholecalciferol (VITAMIN D3) 5000 units capsule capsule Take 5,000 Units by mouth Daily.   • Cyanocobalamin (VITAMIN B-12) 1000 MCG sublingual tablet Place 1 tablet under the tongue Daily.   • Cyanocobalamin (VITAMIN B-12) 5000 MCG tablet dispersible Take 5,000 mcg by mouth Daily.   • Multiple Vitamins-Minerals (MULTIVITAMIN ADULTS PO) Take 1 tablet by mouth Daily.   • mupirocin (BACTROBAN NASAL) 2 % nasal ointment into each nostril. AS DIRECTED PREOP   • NON FORMULARY Take 1 capsule by mouth Daily. Avocado/soy 100 mg/200mg   PT TO STOP PER MD INSTRUCTION " [facility]  H&P Note    Romie Smith Patient Status:  [place]    3/21/2021 MRN [id number]   [facility] 2NW-A Attending Deisi Cortes, [address] [facility] Day # 0 PCP Jenna Quinn DO     Date of Admission:      HPI:  Vinod Lyons is   • NON FORMULARY Take 525 mg by mouth 2 (Two) Times a Day. Celadrin   • oxyCODONE-acetaminophen (PERCOCET) 5-325 MG per tablet 1-2 q 6 hrs prn pain (Patient taking differently: Take 1 tablet by mouth Every 6 (Six) Hours As Needed. 1-2 q 6 hrs prn pain)     Current Facility-Administered Medications on File Prior to Visit   Medication   • [] mupirocin (BACTROBAN) 2 % nasal ointment     Current Medications:  Scheduled Meds:  Continuous Infusions:  No current facility-administered medications for this visit.   PRN Meds:.    I have reviewed the patient's medical history in detail and updated the computerized patient record.  Review and summarization of old records include:    Past Medical History:   Diagnosis Date   • Bilateral hip pain    • History of anxiety    • History of prostate cancer    • History of seizures     NONE SINCE 2013   • Osteoarthritis    • PONV (postoperative nausea and vomiting)    • Stroke    • Unsteady gait         Past Surgical History:   Procedure Laterality Date   • APPENDECTOMY     • ENDOSCOPY     • INGUINAL HERNIA REPAIR Right 3/9/2018    Procedure: RIGHT INGUINAL HERNIA REPAIR WITH MESH;  Surgeon: Matt Zamora Jr., MD;  Location: [facility];  Service: General   • PROSTATECTOMY          Social History     Occupational History   • Not on file.     Social History Main Topics   • Smoking status: Never Smoker   • Smokeless tobacco: Never Used   • Alcohol use No   • Drug use: No   • Sexual activity: Not on file    Social History     Social History Narrative   • No narrative on file        Family History   Problem Relation Age of Onset   • Malig Hyperthermia Neg Hx        ROS: 14 point review of systems was performed and was negative except for documented findings in HPI and today's encounter.     Allergies:   Allergies   Allergen Reactions   • Asa [Aspirin] GI Bleeding     Constitutional:  Denies fever, shaking or chills   Eyes:  Denies change in visual acuity   HENT:  Denies  Alla 3 hr Gestational Fasting       1 Hour glucose       2 Hour glucose       3 Hour glucose         3rd Trimester Labs (GA 24-41w)     Test Value Date Time    Antibody Screen OB  Negative  03/21/21 0210    Group B Strep OB       Group B Strep Culture "nasal congestion or sore throat   Respiratory:  Denies cough or shortness of breath   Cardiovascular:  Denies chest pain or severe LE edema   GI:  Denies abdominal pain, nausea, vomiting, bloody stools or diarrhea   Musculoskeletal:  Denies numbness tingling or loss of motor function except as outlined above in history of present illness.  : Denies painful urination or hematuria  Integument:  Denies rash, lesion or ulceration   Neurologic:  Denies headache or focal weakness  Endocrine:  Denies lymphadenopathy  Psych:  Denies confusion or change in mental status   Hem:  Denies active bleeding    Physical Exam: 77 y.o. male  Wt Readings from Last 3 Encounters:   04/06/18 74.4 kg (164 lb)   04/04/18 74.5 kg (164 lb 3.2 oz)   03/22/18 74.5 kg (164 lb 3.2 oz)     Ht Readings from Last 3 Encounters:   04/06/18 177.8 cm (70\")   04/04/18 177.8 cm (70\")   03/22/18 177.8 cm (70\")     Body mass index is 23.53 kg/m².  Facility age limit for growth percentiles is 20 years.  Vitals:    04/06/18 1018   Temp: 97.9 °F (36.6 °C)       Vital signs reviewed.   General Appearance:    Alert, cooperative, in no acute distress                  Eyes: conjunctiva clear  ENT: external ears and nose atraumatic  CV: no peripheral edema  Resp: normal respiratory effort  Skin: no rashes or wounds; normal turgor  Psych: mood and affect appropriate  Lymph: no nodes appreciated  Neuro: gross sensation intact  Vascular:  Palpable peripheral pulse in noted extremity  Musculoskeletal Extremities: HIP Exam: antalgic and stiff-legged gait with assistive device right hip, Stinchfield postitive, pain with internal rotation, stiffness and MEERA test positive 2+ pedal pulses and brisk capillary refill Pedal edema none           Diagnostic Tests:  Appointment on 04/04/2018   Component Date Value Ref Range Status   • Glucose 04/04/2018 91  65 - 99 mg/dL Final   • BUN 04/04/2018 14  8 - 23 mg/dL Final   • Creatinine 04/04/2018 0.77  0.76 - 1.27 mg/dL Final " vigorous after delivery, TCC of 30 seconds, infant was dried, orally suctioned and stimulated. Drifiting saturations during transition required CPAP +5 30% to maintain appropriate saturations for age of life. HR >150s throughout resuscitation.   Transport   • Sodium 04/04/2018 140  136 - 145 mmol/L Final   • Potassium 04/04/2018 4.7  3.5 - 5.2 mmol/L Final   • Chloride 04/04/2018 99  98 - 107 mmol/L Final   • CO2 04/04/2018 30.0* 22.0 - 29.0 mmol/L Final   • Calcium 04/04/2018 9.6  8.6 - 10.5 mg/dL Final   • eGFR Non African Amer 04/04/2018 98  >60 mL/min/1.73 Final   • BUN/Creatinine Ratio 04/04/2018 18.2  7.0 - 25.0 Final   • Anion Gap 04/04/2018 11.0  mmol/L Final   • WBC 04/04/2018 8.63  4.50 - 10.70 10*3/mm3 Final   • RBC 04/04/2018 3.99* 4.60 - 6.00 10*6/mm3 Final   • Hemoglobin 04/04/2018 11.9* 13.7 - 17.6 g/dL Final   • Hematocrit 04/04/2018 38.6* 40.4 - 52.2 % Final   • MCV 04/04/2018 96.7* 79.8 - 96.2 fL Final   • MCH 04/04/2018 29.8  27.0 - 32.7 pg Final   • MCHC 04/04/2018 30.8* 32.6 - 36.4 g/dL Final   • RDW 04/04/2018 14.9* 11.5 - 14.5 % Final   • RDW-SD 04/04/2018 52.8  37.0 - 54.0 fl Final   • MPV 04/04/2018 9.0  6.0 - 12.0 fL Final   • Platelets 04/04/2018 415  140 - 500 10*3/mm3 Final   • Color, UA 04/04/2018 Yellow  Yellow, Straw Final   • Appearance, UA 04/04/2018 Cloudy* Clear Final   • pH, UA 04/04/2018 7.0  5.0 - 8.0 Final   • Specific Gravity, UA 04/04/2018 1.016  1.005 - 1.030 Final   • Glucose, UA 04/04/2018 Negative  Negative Final   • Ketones, UA 04/04/2018 Negative  Negative Final   • Bilirubin, UA 04/04/2018 Negative  Negative Final   • Blood, UA 04/04/2018 Negative  Negative Final   • Protein, UA 04/04/2018 Negative  Negative Final   • Leuk Esterase, UA 04/04/2018 Negative  Negative Final   • Nitrite, UA 04/04/2018 Negative  Negative Final   • Urobilinogen, UA 04/04/2018 1.0 E.U./dL  0.2 - 1.0 E.U./dL Final       Imaging was done previously in the office, images were personally viewed and discussed at length with the patient:    Indication: pain related symptoms,  Views: 2V AP&LAT right hip(s)   Findings: severe end-stage arthritis (bone on bone, subchondral sclerosis/cysts, osteophytes)  Comparison views: viewed last xray done in the  office.        Assessment:  Patient Active Problem List   Diagnosis   • Hypertension   • Anxiety   • Osteoarthritis   • Seizure disorder   • Low back pain radiating to both legs   • Avascular necrosis of bones of both hips   • Arthritis of right hip   • Arthritis of left hip   • Degenerative disc disease, lumbar   • Chronic pain of both knees   • Pain in both knees   • Right inguinal hernia       Plan:  Dr. George Gonzalez reviewed anatomy of a total joint arthroplasty in laymen's terms, as well as typical postoperative recovery and possibly 6-12 months for maximal recovery, and possible need for rehabilitation stay after hospitalization. We also discussed risks, benefits, alternatives, and limitations of procedure with risks including but not limited to neurovascular damage, bleeding, infection, malalignment, chronic pian, failure of implants, osteolysis, loosening of implants, loss of motion, weakness, stiffness, instability, DVT, pulmonary embolus, death, stroke, complex regional pain syndrome, myocardial infarction, and need for additional procedures. Concept of substitution vs. replacement discussed.  No guarantees were given regarding results of surgery.      Dong Cevallos was given the opportunity to ask and have all questions answered today.  The patient voiced understanding of the risks, benefits, and alternative forms of treatment that were discussed and the patient consents to proceed with surgery.     Patient's blood clot history is negative.  Planned DVT prophylaxis for surgery:  Aspirin has caused bleeding in rectal fissures in the past. Will order Xarelto x 6 weeks due to hx of prostate CA and stroke.    Discharge Plan: POD 2-3 to home and home health, just had hernia surgery and did well with that and is cleared for hip replacement.     Patient was seen by MIRELLA Em in the office today.    Date: 4/6/2018  MIRELLA White

## (undated) DEVICE — SPNG GZ WOVN 4X4IN 12PLY 10/BX STRL

## (undated) DEVICE — SYR LUERLOK 20CC

## (undated) DEVICE — PREMIUM WET SKIN PREP TRAY: Brand: MEDLINE INDUSTRIES, INC.

## (undated) DEVICE — NDL HYPO PRECISIONGLIDE REG 25G 1 1/2

## (undated) DEVICE — GLV SURG TRIUMPH CLASSIC PF LTX 8.5 STRL

## (undated) DEVICE — GLV SURG SENSICARE GREEN W/ALOE PF LF 9 STRL

## (undated) DEVICE — SUT VIC 2/0 TIES 18IN J111T

## (undated) DEVICE — APPL CHLORAPREP W/TINT 26ML ORNG

## (undated) DEVICE — DRSNG TELFA PAD NONADH STR 1S 3X8IN

## (undated) DEVICE — ENCORE® LATEX ORTHO SIZE 7.5, STERILE LATEX POWDER-FREE SURGICAL GLOVE: Brand: ENCORE

## (undated) DEVICE — DRN PENRS 5/8X18IN LTX

## (undated) DEVICE — IRRIGATOR BULB ASEPTO 60CC STRL

## (undated) DEVICE — ORTHOLOCK(R) EX-PIN 4 MM X 150 MM

## (undated) DEVICE — GLV SURG TRIUMPH CLASSIC PF LTX 9 STRL

## (undated) DEVICE — SUT ETHIB 0 CT2 CR8 18IN CX27D

## (undated) DEVICE — PK HIP TOTL 40

## (undated) DEVICE — HANDPIECE SET WITH COAXIAL HIGH FLOW TIP AND SUCTION TUBE: Brand: INTERPULSE

## (undated) DEVICE — PK PROC MAJ 40

## (undated) DEVICE — COVER,MAYO STAND,STERILE: Brand: MEDLINE

## (undated) DEVICE — ENCORE® LATEX ORTHO SIZE 8.5, STERILE LATEX POWDER-FREE SURGICAL GLOVE: Brand: ENCORE

## (undated) DEVICE — NDL SPINE 18G 31/2IN PNK

## (undated) DEVICE — SUT VIC 3/0 PS2 27IN J427H

## (undated) DEVICE — DRSNG SURESITE WNDW 4X4.5

## (undated) DEVICE — ANTIBACTERIAL UNDYED BRAIDED (POLYGLACTIN 910), SYNTHETIC ABSORBABLE SUTURE: Brand: COATED VICRYL

## (undated) DEVICE — ADHS SKIN DERMABOND TOP ADVANCED

## (undated) DEVICE — Device

## (undated) DEVICE — INSTRUMENT BATTERY

## (undated) DEVICE — SUT VICRYL 1 CT1 27IN  JJ40G

## (undated) DEVICE — SYR LUERLOK 30CC

## (undated) DEVICE — SUT PROLN 2/0 CT2 30IN 8411H

## (undated) DEVICE — SUT MNCRYL PLS ANTIB UD 4/0 PS2 18IN

## (undated) DEVICE — HYDROGEL COATED LATEX URINE METER FOLEY TRAY,16 FR/CH (5.3 MM), 5 ML CATHETER PRE-CONNECTED TO 2000 ML DRAINAGE BAG WITH NEEDLE SAMPLING: Brand: DOVER

## (undated) DEVICE — PILLW ABD SM